# Patient Record
Sex: FEMALE | Race: WHITE | NOT HISPANIC OR LATINO | URBAN - METROPOLITAN AREA
[De-identification: names, ages, dates, MRNs, and addresses within clinical notes are randomized per-mention and may not be internally consistent; named-entity substitution may affect disease eponyms.]

---

## 2019-03-05 ENCOUNTER — EMERGENCY (EMERGENCY)
Facility: HOSPITAL | Age: 84
LOS: 1 days | Discharge: HOME | End: 2019-03-05
Attending: EMERGENCY MEDICINE
Payer: MEDICARE

## 2019-03-05 VITALS
RESPIRATION RATE: 16 BRPM | HEART RATE: 60 BPM | HEIGHT: 61 IN | SYSTOLIC BLOOD PRESSURE: 162 MMHG | DIASTOLIC BLOOD PRESSURE: 82 MMHG | WEIGHT: 164.91 LBS | OXYGEN SATURATION: 97 % | TEMPERATURE: 97 F

## 2019-03-05 VITALS — DIASTOLIC BLOOD PRESSURE: 53 MMHG | SYSTOLIC BLOOD PRESSURE: 160 MMHG | HEART RATE: 62 BPM

## 2019-03-05 PROCEDURE — 99282 EMERGENCY DEPT VISIT SF MDM: CPT

## 2019-03-05 PROCEDURE — 99283 EMERGENCY DEPT VISIT LOW MDM: CPT

## 2019-03-05 RX ORDER — ACETAMINOPHEN 500 MG
650 TABLET ORAL ONCE
Qty: 0 | Refills: 0 | Status: COMPLETED | OUTPATIENT
Start: 2019-03-05 | End: 2019-03-05

## 2019-03-05 RX ADMIN — Medication 650 MILLIGRAM(S): at 08:37
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
## 2019-03-05 NOTE — ED PROVIDER NOTE - NS_EDPROVIDERDISPOUSERTYPE_ED_A_ED
124 Scribe Attestation (For Scribes USE Only)... Attending Attestation (For Attendings USE Only).../Scribe Attestation (For Scribes USE Only)...

## 2019-03-05 NOTE — ED PROVIDER NOTE - CLINICAL SUMMARY MEDICAL DECISION MAKING FREE TEXT BOX
pt refusing workup, feels fine, offered to check carboxy level, pt and daughter share that the exposure was less than 5 minutes as pt exits. discussed anticipatory guidance and return precautions.

## 2019-03-05 NOTE — ED ADULT NURSE NOTE - NSIMPLEMENTINTERV_GEN_ALL_ED
Implemented All Fall with Harm Risk Interventions:  [place] to call system. Call bell, personal items and telephone within reach. Instruct patient to call for assistance. Room bathroom lighting operational. Non-slip footwear when patient is off stretcher. Physically safe environment: no spills, clutter or unnecessary equipment. Stretcher in lowest position, wheels locked, appropriate side rails in place. Provide visual cue, wrist band, yellow gown, etc. Monitor gait and stability. Monitor for mental status changes and reorient to person, place, and time. Review medications for side effects contributing to fall risk. Reinforce activity limits and safety measures with patient and family. Provide visual clues: red socks.

## 2019-03-05 NOTE — ED ADULT NURSE NOTE - CAS DISCH CONDITION
Elbow Bursitis  Introduction  A bursa is a fluid-filled sac that covers and protects a joint. Bursitis is when the fluid-filled sac gets puffy and sore (inflamed). Elbow bursitis, also called olecranon bursitis, happens over your elbow. This may be caused by:  · Injury (acute trauma) to your elbow.  · Leaning on hard surfaces for long periods of time.  · Infection from an injury that breaks the skin near your elbow.  · A bone growth (spur) that forms at the tip of your elbow.  · A medical condition that causes inflammation in your body, such as:  ¨ Gout.  ¨ Rheumatoid arthritis.  Sometimes the cause is not known.  Follow these instructions at home:  · Take medicines only as told by your doctor.  · If you were prescribed an antibiotic medicine, finish all of it even if you start to feel better.  · If your bursitis is caused by an injury, rest your elbow and wear your bandage as told by your doctor. You may also apply ice to the injured area as told by your doctor:  ¨ Put ice in a plastic bag.  ¨ Place a towel between your skin and the bag.  ¨ Leave the ice on for 20 minutes, 2-3 times per day.  · Do not do any activities that cause pain to your elbow.  · Use elbow pads or wraps to cushion your elbow.  Contact a doctor if:  · You have a fever.  · Your symptoms do not get better with treatment.  · Your pain or swelling gets worse.  · Your pain or swelling goes away and then comes back.  · You have drainage of pus from the swollen area over your elbow.  This information is not intended to replace advice given to you by your health care provider. Make sure you discuss any questions you have with your health care provider.  Document Released: 06/07/2011 Document Revised: 05/25/2017 Document Reviewed: 08/26/2015  © 2017 [employer]    
Stable
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
## 2021-03-10 PROBLEM — G30.9 ALZHEIMER'S DISEASE, UNSPECIFIED: Chronic | Status: ACTIVE | Noted: 2019-03-06

## 2021-03-19 ENCOUNTER — APPOINTMENT (OUTPATIENT)
Dept: PULMONOLOGY | Facility: CLINIC | Age: 86
End: 2021-03-19

## 2021-10-13 ENCOUNTER — EMERGENCY (EMERGENCY)
Facility: HOSPITAL | Age: 86
LOS: 1 days | Discharge: LEFT BEFORE DISCHARGE | End: 2021-10-13
Attending: EMERGENCY MEDICINE
Payer: MEDICARE

## 2021-10-13 VITALS
RESPIRATION RATE: 14 BRPM | DIASTOLIC BLOOD PRESSURE: 66 MMHG | HEIGHT: 61 IN | HEART RATE: 60 BPM | SYSTOLIC BLOOD PRESSURE: 105 MMHG | OXYGEN SATURATION: 95 % | TEMPERATURE: 98 F

## 2021-10-13 DIAGNOSIS — Z95.0 PRESENCE OF CARDIAC PACEMAKER: Chronic | ICD-10-CM

## 2021-10-13 PROCEDURE — 99283 EMERGENCY DEPT VISIT LOW MDM: CPT

## 2021-10-13 NOTE — ED PROVIDER NOTE - NSICDXPASTMEDICALHX_GEN_ALL_CORE_FT
PAST MEDICAL HISTORY:  Alzheimer disease     Diabetes     Hypercholesterolemia     Hypertension       Afib     Dementia

## 2021-10-13 NOTE — ED PROVIDER NOTE - NSFOLLOWUPINSTRUCTIONS_ED_ALL_ED_FT
Hypotension    WHAT YOU NEED TO KNOW:    What is hypotension? Hypotension is a condition that causes your blood pressure (BP) to drop lower than it should be. Hypotension may be mild, serious, or life-threatening.    What are the most common types of hypotension?   •Acute: Acute hypotension is a sudden drop in your BP that may be life-threatening.      •Constitutional: Constitutional hypotension means your BP is lower than it should be most or all of the time. This is a chronic condition that occurs with no known medical cause.      •Orthostatic: Orthostatic hypotension normally occurs when you stand up from a sitting or lying position. It is also called postural hypotension.      •Postprandial: Postprandial hypotension means your BP becomes too low after you eat a meal. Your BP may drop within 2 hours after you eat, and is more common when you eat meals high in carbohydrates.      What causes hypotension?   •Anemia or blood loss      •Nervous system, heart, or adrenal disorders      •Dehydration from not drinking enough liquids, frequent vomiting, diarrhea, or severe burns      •Some medicines, such as those used to treat high blood pressure, heart conditions, pain, depression, or cancer      •A blood infection (sepsis)      What increases my risk for hypotension?   •Increasing age      •Drug and alcohol use      •Being bedridden for a long period of time      •Low body weight      •Hemodialysis      •Medical conditions such as diabetes, Parkinson disease, and Alzheimer disease      What are the signs and symptoms of hypotension?   •Feeling anxious, nauseated, tired, or weak      •Lightheadedness, dizziness, or fainting      •Increased sweating, palpitations (fast, forceful heartbeats), tremors, or a seizure       •Headache or pain in your neck, shoulders, chest, lower back, buttocks, or legs      •Blurred vision or changes in vision      •Confusion, decreased memory, or inability to pay attention      How is hypotension diagnosed? Your healthcare provider will ask about your symptoms, health conditions, and medicines. Tell him how often you have symptoms, and if they change during the day. Tell him if you recently have had diarrhea, vomiting, or blood loss. Your healthcare provider will carefully examine you, listen to your heart, and may check your eyes. He may check your body movements and sensations (ability to feel something that touches you). You may also need the following:   •Blood pressure testing: This may be done while you lie down, sit, and stand. You may need to wear a BP monitor to record your BP for up to 24 hours.       •Tilt table testing: You are secured on a table that changes your position. Your BP is checked when the table moves you into each position.      What tests may help find the cause of my hypotension? Many times, hypotension is a symptom of another condition. You may need any of the following tests to find the cause of your hypotension:   •Blood tests: A sample of your blood or urine may be checked for anemia or other conditions causing your hypotension.      •EKG: This test records the electrical activity of your heart. It is used to check for damage or other heart problems that may be causing your hypotension.       •Autonomic nervous system tests: Your healthcare provider may check for changes in how fast your heart beats when you take deep breaths. He may also check for changes in your BP while you put your hand in ice cold water.      •24 hour urine test: During this test you will need to collect all of your urine for 24 hours. You will urinate into a container and the urine will be put into a jug. The jug will need to be kept cold. If you urinate during the night, you will need to save that urine. Healthcare providers will measure and record how much you urinate. At the end of 24 hours, the urine will be sent to a lab for tests.      •Echocardiogram: This is a type of ultrasound, also called an echo. An ultrasound uses sound waves to show pictures of your heart on a monitor. An ultrasound may be done to show how your heart moves when it beats.      How is hypotension treated? Your healthcare provider will work with you to find the cause of your hypotension and help treat your symptoms. You may need the following:   •Compression stockings or abdominal binder: These may help blood return to your heart and decrease your hypotension.      •IV fluids: These may be used to increase your BP if you are dehydrated or have blood loss or sepsis.       •Medicines: ?Alpha-adrenoreceptor agonists: These medicines may increase your BP and decrease your symptoms.       ?Steroids: This medicine helps prevent salt loss from your body. Steroids may also help increase the amount of fluid in your body and raise your BP.      ?Vasopressors: These medicines help constrict (make smaller) your blood vessels and increase your BP. Vasopressor medicines may increase the blood flow to your brain and help decrease your symptoms.      ?Antidiuretic hormone: This medicine helps control your BP and helps decrease your need to urinate during the night.      ?Antiparkinson medicine: This medicine may help increase your standing BP and decrease your symptoms.        What are the risks of hypotension? Without treatment, your symptoms may get worse. You may faint or fall often, which can lead to injuries, such as a broken bone. You may be at increased risk for depression, confusion, and memory problems. Hypotension may cause decreased blood flow to your brain and heart. This may lead to a stroke or heart attack and can be life-threatening. Sepsis-related hypotension is life-threatening without treatment.    How can I manage my symptoms?   •Change your position slowly: When you get out of bed, sit up first, then slowly move your legs to the side of the bed. If you are not having any symptoms, slowly stand up. If you have symptoms, sit down right away.      •Avoid straining: Activities and movements that cause you to strain can cause a drop in your BP. Activities to avoid include lifting, coughing, and other movements that increase the feeling of pressure in your chest.      •Avoid the heat: This can cause a decrease in your BP. Stay inside during very hot days, or limit the amount of time you are outside. Do not take hot baths.      •Exercise and do physical counter maneuvers: Ask your healthcare provider about the best exercise plan for you. Physical counter maneuvers may help to increase your BP and increase blood flow to your heart. They include crossing your legs, squatting, and bending at the waist. You can also rise up on your toes while you are standing, and tighten your thigh muscles.      •Drink liquids as directed: Ask your healthcare provider how much liquid to drink each day and which liquids are best for you. Drink 500 milliliters (½ liter) of liquid quickly in the morning or before meals to help increase your BP. Your healthcare provider may tell you to drink 2 cups of coffee with, or after, breakfast and lunch. The caffeine in the coffee can help prevent a drop in your BP. Your healthcare provider may also give you caffeine pills.       •Change how you eat meals: If your BP drops after you eat large meals, try to eat smaller meals more often. Eat foods low in carbohydrates and cholesterol to help prevent BP drops after you eat. Ask if you need to increase the amount of sodium (salt) you eat each day.      •Raise the head of your bed: Raise the head of your bed 4 to 8 inches. This can help prevent morning BP drops and decrease the need to urinate during the night.      •Do not drink alcohol: Alcohol can make your symptoms worse. Ask for information if you need help quitting.      When should I contact my healthcare provider?   •You vomit several times or have diarrhea, and you cannot drink liquid.      •You have a fever.       •You have new or increased symptoms, such as dizziness, weakness, or fainting.      •Your legs, ankles, and feet are swollen, or you gain weight for no known reason.      •You have questions or concerns about your condition or care.      When should I seek immediate care or call 911?   •You become confused or cannot speak.      •You urinate very little or not at all.      •You have a seizure.      •You have chest pain or trouble breathing.      •You have changes in vision or cannot see.      CARE AGREEMENT:    You have the right to help plan your care. Learn about your health condition and how it may be treated. Discuss treatment options with your healthcare providers to decide what care you want to receive. You always have the right to refuse treatment.
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
## 2021-10-13 NOTE — ED ADULT NURSE NOTE - EXPLANATION OF PATIENT'S REASON FOR LEAVING
Daughter states she does not want interventions for patient and wants to take her home. Patient is stable and doctor explained risks of leaving AMA.

## 2021-10-13 NOTE — ED PROVIDER NOTE - CLINICAL SUMMARY MEDICAL DECISION MAKING FREE TEXT BOX
85 y/o female h/o dementia, HTN, DM, pacemaker, afib on Eliquis, is here for fall out fo bed. Was found to be hypotensive. Pt's daughter would like to take pt home. Daughter understands the risk of low BP due to GI bleed or heart attack, among other causes and would still like to take pt home. Daughter refuses any blood work, EKG, or even core temperature before hand. Risk/benefits/alternatives explained.

## 2021-10-13 NOTE — ED PROVIDER NOTE - OBJECTIVE STATEMENT
85 y/o female h/o dementia, DM, HTN, permanent pacemaker, afib on Eliquis, presents after slipping out of bed. Home health aide called [facility] and pt was found to have BP of 60/40 at home which gradually improved to 105/66 after a half liter of fluids. Pt's daughter Paradise arrived and states that the pt's long time home health aide of 4-5 years has left and for the past 5 days pt has had different aides. Daughter feels the home health aid panicked and acted inappropriately, and would like to take the pt home. Reports no recent illness. Pt has been acting at baseline. No bleeding. No diarrhea. No fever. No vomiting. Daughter states pt just had blood work yesterday through her visiting home doctor Dr. Barron. 87 y/o female h/o dementia, DM, HTN, permanent pacemaker, afib on Eliquis, presents after slipping out of bed. Home health aide called [facility] and pt was found to have BP of 60/40 at home which gradually improved to 105/66 after a half liter of fluids. Pt's daughter Paradise arrived and states that the pt's long time home health aide of 4-5 years has left and for the past 5 days pt has had different aides. Daughter feels the home health aid panicked and acted inappropriately, and would like to take the pt home. Reports no recent illness. Pt has been acting at baseline. No bleeding. No diarrhea. No fever. No vomiting. Daughter states pt just had blood work yesterday through her visiting home doctor Dr. Barron.

## 2021-10-13 NOTE — ED ADULT NURSE NOTE - OBJECTIVE STATEMENT
Patient presents to ED for hypotension. Daughter Paradise is at bedside. Daughter states that she does not want any interventions for patient as she will take patient home. Patient is alert and awake, calm. No respiratory distress noted. VS stable and BP is improved.

## 2021-10-13 NOTE — ED PROVIDER NOTE - PATIENT PORTAL LINK FT
You can access the FollowMyHealth Patient Portal offered by [facility] by registering at the following website: [email or web address]. By joining [facility]’s FollowMyHealth portal, you will also be able to view your health information using other applications (apps) compatible with our system.

## 2022-01-04 ENCOUNTER — INPATIENT (INPATIENT)
Facility: HOSPITAL | Age: 87
LOS: 6 days | Discharge: HOME | DRG: 378 | End: 2022-01-11
Attending: STUDENT IN AN ORGANIZED HEALTH CARE EDUCATION/TRAINING PROGRAM | Admitting: STUDENT IN AN ORGANIZED HEALTH CARE EDUCATION/TRAINING PROGRAM
Payer: MEDICARE

## 2022-01-04 VITALS
HEART RATE: 60 BPM | OXYGEN SATURATION: 96 % | HEIGHT: 61 IN | RESPIRATION RATE: 18 BRPM | SYSTOLIC BLOOD PRESSURE: 57 MMHG | WEIGHT: 167.55 LBS | DIASTOLIC BLOOD PRESSURE: 34 MMHG

## 2022-01-04 DIAGNOSIS — I10 ESSENTIAL (PRIMARY) HYPERTENSION: ICD-10-CM

## 2022-01-04 DIAGNOSIS — F03.90 UNSPECIFIED DEMENTIA WITHOUT BEHAVIORAL DISTURBANCE: ICD-10-CM

## 2022-01-04 DIAGNOSIS — K92.2 GASTROINTESTINAL HEMORRHAGE, UNSPECIFIED: ICD-10-CM

## 2022-01-04 DIAGNOSIS — N17.9 ACUTE KIDNEY FAILURE, UNSPECIFIED: ICD-10-CM

## 2022-01-04 DIAGNOSIS — E11.9 TYPE 2 DIABETES MELLITUS WITHOUT COMPLICATIONS: ICD-10-CM

## 2022-01-04 DIAGNOSIS — Z95.0 PRESENCE OF CARDIAC PACEMAKER: Chronic | ICD-10-CM

## 2022-01-04 DIAGNOSIS — I48.91 UNSPECIFIED ATRIAL FIBRILLATION: ICD-10-CM

## 2022-01-04 DIAGNOSIS — Z29.9 ENCOUNTER FOR PROPHYLACTIC MEASURES, UNSPECIFIED: ICD-10-CM

## 2022-01-04 LAB
ALBUMIN SERPL ELPH-MCNC: 2.1 G/DL — LOW (ref 3.5–5)
ALP SERPL-CCNC: 43 U/L — SIGNIFICANT CHANGE UP (ref 40–120)
ALT FLD-CCNC: 23 U/L DA — SIGNIFICANT CHANGE UP (ref 10–60)
ANION GAP SERPL CALC-SCNC: 9 MMOL/L — SIGNIFICANT CHANGE UP (ref 5–17)
APTT BLD: 33.9 SEC — SIGNIFICANT CHANGE UP (ref 27.5–35.5)
AST SERPL-CCNC: 13 U/L — SIGNIFICANT CHANGE UP (ref 10–40)
BASOPHILS # BLD AUTO: 0.04 K/UL — SIGNIFICANT CHANGE UP (ref 0–0.2)
BASOPHILS NFR BLD AUTO: 0.4 % — SIGNIFICANT CHANGE UP (ref 0–2)
BILIRUB SERPL-MCNC: 0.3 MG/DL — SIGNIFICANT CHANGE UP (ref 0.2–1.2)
BUN SERPL-MCNC: 62 MG/DL — HIGH (ref 7–18)
CALCIUM SERPL-MCNC: 7.9 MG/DL — LOW (ref 8.4–10.5)
CHLORIDE SERPL-SCNC: 106 MMOL/L — SIGNIFICANT CHANGE UP (ref 96–108)
CO2 SERPL-SCNC: 24 MMOL/L — SIGNIFICANT CHANGE UP (ref 22–31)
CREAT SERPL-MCNC: 1.53 MG/DL — HIGH (ref 0.5–1.3)
EOSINOPHIL # BLD AUTO: 0.1 K/UL — SIGNIFICANT CHANGE UP (ref 0–0.5)
EOSINOPHIL NFR BLD AUTO: 1 % — SIGNIFICANT CHANGE UP (ref 0–6)
GLUCOSE SERPL-MCNC: 310 MG/DL — HIGH (ref 70–99)
HCT VFR BLD CALC: 29.1 % — LOW (ref 34.5–45)
HCT VFR BLD CALC: 43.4 % — SIGNIFICANT CHANGE UP (ref 34.5–45)
HGB BLD-MCNC: 13.9 G/DL — SIGNIFICANT CHANGE UP (ref 11.5–15.5)
HGB BLD-MCNC: 9.3 G/DL — LOW (ref 11.5–15.5)
IMM GRANULOCYTES NFR BLD AUTO: 0.8 % — SIGNIFICANT CHANGE UP (ref 0–1.5)
INR BLD: 1.26 RATIO — HIGH (ref 0.88–1.16)
LACTATE SERPL-SCNC: 2.5 MMOL/L — HIGH (ref 0.7–2)
LYMPHOCYTES # BLD AUTO: 1.15 K/UL — SIGNIFICANT CHANGE UP (ref 1–3.3)
LYMPHOCYTES # BLD AUTO: 11.7 % — LOW (ref 13–44)
MCHC RBC-ENTMCNC: 28.9 PG — SIGNIFICANT CHANGE UP (ref 27–34)
MCHC RBC-ENTMCNC: 32 GM/DL — SIGNIFICANT CHANGE UP (ref 32–36)
MCV RBC AUTO: 90.4 FL — SIGNIFICANT CHANGE UP (ref 80–100)
MONOCYTES # BLD AUTO: 0.44 K/UL — SIGNIFICANT CHANGE UP (ref 0–0.9)
MONOCYTES NFR BLD AUTO: 4.5 % — SIGNIFICANT CHANGE UP (ref 2–14)
NEUTROPHILS # BLD AUTO: 8.06 K/UL — HIGH (ref 1.8–7.4)
NEUTROPHILS NFR BLD AUTO: 81.6 % — HIGH (ref 43–77)
NRBC # BLD: 0 /100 WBCS — SIGNIFICANT CHANGE UP (ref 0–0)
OB PNL STL: POSITIVE
PLATELET # BLD AUTO: 152 K/UL — SIGNIFICANT CHANGE UP (ref 150–400)
POTASSIUM SERPL-MCNC: 4.6 MMOL/L — SIGNIFICANT CHANGE UP (ref 3.5–5.3)
POTASSIUM SERPL-SCNC: 4.6 MMOL/L — SIGNIFICANT CHANGE UP (ref 3.5–5.3)
PROT SERPL-MCNC: 5.3 G/DL — LOW (ref 6–8.3)
PROTHROM AB SERPL-ACNC: 14.8 SEC — HIGH (ref 10.6–13.6)
RBC # BLD: 3.22 M/UL — LOW (ref 3.8–5.2)
RBC # FLD: 12 % — SIGNIFICANT CHANGE UP (ref 10.3–14.5)
SARS-COV-2 RNA SPEC QL NAA+PROBE: SIGNIFICANT CHANGE UP
SODIUM SERPL-SCNC: 139 MMOL/L — SIGNIFICANT CHANGE UP (ref 135–145)
TROPONIN I, HIGH SENSITIVITY RESULT: 6.1 NG/L — SIGNIFICANT CHANGE UP
WBC # BLD: 9.87 K/UL — SIGNIFICANT CHANGE UP (ref 3.8–10.5)
WBC # FLD AUTO: 9.87 K/UL — SIGNIFICANT CHANGE UP (ref 3.8–10.5)

## 2022-01-04 PROCEDURE — 99223 1ST HOSP IP/OBS HIGH 75: CPT | Mod: GC

## 2022-01-04 PROCEDURE — 71045 X-RAY EXAM CHEST 1 VIEW: CPT | Mod: 26

## 2022-01-04 PROCEDURE — 99291 CRITICAL CARE FIRST HOUR: CPT

## 2022-01-04 RX ORDER — PROTHROMBIN COMPLEX CONCENTRATE (HUMAN) 25.5; 16.5; 24; 22; 22; 26 [IU]/ML; [IU]/ML; [IU]/ML; [IU]/ML; [IU]/ML; [IU]/ML
3000 POWDER, FOR SOLUTION INTRAVENOUS ONCE
Refills: 0 | Status: COMPLETED | OUTPATIENT
Start: 2022-01-04 | End: 2022-01-04

## 2022-01-04 RX ORDER — MEMANTINE HYDROCHLORIDE 10 MG/1
10 TABLET ORAL DAILY
Refills: 0 | Status: DISCONTINUED | OUTPATIENT
Start: 2022-01-04 | End: 2022-01-11

## 2022-01-04 RX ORDER — LEVOTHYROXINE SODIUM 125 MCG
100 TABLET ORAL DAILY
Refills: 0 | Status: DISCONTINUED | OUTPATIENT
Start: 2022-01-04 | End: 2022-01-11

## 2022-01-04 RX ORDER — ATORVASTATIN CALCIUM 80 MG/1
80 TABLET, FILM COATED ORAL AT BEDTIME
Refills: 0 | Status: DISCONTINUED | OUTPATIENT
Start: 2022-01-04 | End: 2022-01-11

## 2022-01-04 RX ORDER — ERGOCALCIFEROL 1.25 MG/1
50000 CAPSULE ORAL
Refills: 0 | Status: DISCONTINUED | OUTPATIENT
Start: 2022-01-04 | End: 2022-01-11

## 2022-01-04 RX ORDER — ESCITALOPRAM OXALATE 10 MG/1
10 TABLET, FILM COATED ORAL DAILY
Refills: 0 | Status: DISCONTINUED | OUTPATIENT
Start: 2022-01-04 | End: 2022-01-11

## 2022-01-04 RX ORDER — SODIUM CHLORIDE 9 MG/ML
2000 INJECTION INTRAMUSCULAR; INTRAVENOUS; SUBCUTANEOUS ONCE
Refills: 0 | Status: COMPLETED | OUTPATIENT
Start: 2022-01-04 | End: 2022-01-04

## 2022-01-04 RX ORDER — DONEPEZIL HYDROCHLORIDE 10 MG/1
10 TABLET, FILM COATED ORAL AT BEDTIME
Refills: 0 | Status: DISCONTINUED | OUTPATIENT
Start: 2022-01-04 | End: 2022-01-11

## 2022-01-04 RX ORDER — PANTOPRAZOLE SODIUM 20 MG/1
40 TABLET, DELAYED RELEASE ORAL
Refills: 0 | Status: DISCONTINUED | OUTPATIENT
Start: 2022-01-04 | End: 2022-01-09

## 2022-01-04 RX ADMIN — PROTHROMBIN COMPLEX CONCENTRATE (HUMAN) 400 INTERNATIONAL UNIT(S): 25.5; 16.5; 24; 22; 22; 26 POWDER, FOR SOLUTION INTRAVENOUS at 15:46

## 2022-01-04 RX ADMIN — PANTOPRAZOLE SODIUM 40 MILLIGRAM(S): 20 TABLET, DELAYED RELEASE ORAL at 21:20

## 2022-01-04 RX ADMIN — SODIUM CHLORIDE 2000 MILLILITER(S): 9 INJECTION INTRAMUSCULAR; INTRAVENOUS; SUBCUTANEOUS at 14:28

## 2022-01-04 NOTE — H&P ADULT - PROBLEM SELECTOR PLAN 1
- p/w SBP of low 70's  - VS BP 57/37  - FOBT pos  - hgb 9.3>13.9  - s/p NS 2L bolus, 2u pRBC, kcentra, plama, and 1u platelet  - monitor CBC q6h    - GI, Dr. Dalton, onboard

## 2022-01-04 NOTE — H&P ADULT - ATTENDING COMMENTS
Patient was brought because of melena last night and hematochezia this AM.   PMH:  Pacemaker, dementia, DM, HTN, Afib, on eliquis and ASA.   Patient was found to be hypotensive in ED.  She was given fluids, two units of PRBC, and platelets.     Now Alert, cooperative  Vital Signs Last 24 Hrs  T(C): 36.4 (01-04-22 @ 18:15), Max: 36.5 (01-04-22 @ 13:55)  T(F): 97.5 (01-04-22 @ 18:15), Max: 97.7 (01-04-22 @ 13:55)  HR: 62 (01-04-22 @ 18:15) (59 - 62)  BP: 142/69 (01-04-22 @ 18:15) (57/34 - 142/69)  BP(mean): --  RR: 18 (01-04-22 @ 18:15) (18 - 18)  SpO2: 97% (01-04-22 @ 18:15) (96% - 99%)    Pacemaker is palpable  Lungs, clear  Cor, irreg  Abdomen, soft  Neurological, non-focal                     13.9   x     )-----------( x        ( 04 Jan 2022 15:45 )             43.4     01-04    139  |  106  |  62<H>  ----------------------------<  310<H>  4.6   |  24  |  1.53<H>    Ca    7.9<L>      04 Jan 2022 13:20    TPro  5.3<L>  /  Alb  2.1<L>  /  TBili  0.3  /  DBili  x   /  AST  13  /  ALT  23  /  AlkPhos  43  01-04    PT/INR - ( 04 Jan 2022 13:20 )   PT: 14.8 sec;   INR: 1.26 ratio         PTT - ( 04 Jan 2022 13:20 )  PTT:33.9 sec    Lactate Trend  01-04 @ 13:19 Lactate:2.5     POCT Blood Glucose.: 280 mg/dL (04 Jan 2022 16:37)    IMP:  Melena, hematochezia, c/w UGI bleed, stabilized          Dementia, after fluids and blood, mental status is at baseline.  Daughter, Paradise Haddad, prefers not to have CT head because of radiation.           pacemaker in place  Plan: Protonix. NPO.  GI consult, Dr. Dalton will see tomorrow.  Possible EGD tomorrow.           Pacemaker will need to be checked.           Hold ASA and eliquis, at present.

## 2022-01-04 NOTE — ED PROVIDER NOTE - NSICDXPASTMEDICALHX_GEN_ALL_CORE_FT
PAST MEDICAL HISTORY:  Afib     Alzheimer disease     Dementia     Diabetes     Hypercholesterolemia     Hypertension

## 2022-01-04 NOTE — ED PROVIDER NOTE - PROGRESS NOTE DETAILS
Pt's daughter, Paradise, called and updated.  She says there is no advanced directive at this time and she will talk it over with Pt's son.  Phone number [phone]. Pt seen by Dr. Milton from ICU--since Pt is now stable, no e/o active bleeding, H/H repeated and much improved, he advises admission to the floor.   Dr. PHILLIP Ocampo accepts admission. Pt seen by Dr. Milton from ICU--since Pt is now stable, no e/o active bleeding, H/H repeated and much improved, he advises admission to the floor. Dr. Chinchilla, hospitalist, informed for admission.  VS are stable.  Dr. Chinchilla requests consult to KAMI Jenkins. Dr. Dalton was paged for GI but no response.

## 2022-01-04 NOTE — H&P ADULT - HISTORY OF PRESENT ILLNESS
Patient is a 86yoF, w/ AAOx1-2 and ambulates w/ assistance only at baseline, w/ PMH of dementia, DM, HTN, afib (on Eliquis), permanent pacemaker, p/w low systolic blood pressure of low 70's. Patient is a poor historian 2/2 baseline mentation. The daughter reports that patient had a mild to moderate amount of black stool yesterday and black vomits twice this morning. Family reports they haven't notice any changes in her until yesterday. The daughter reports that patient had night sweat and seemed weak today. The daughter reports no signs or complaints of fever, chills, chest pain, sob, abdominal pain, or urinary changes.

## 2022-01-04 NOTE — ED ADULT NURSE NOTE - OBJECTIVE STATEMENT
pt is here for altered mental status.  BIBA, call 911 from her aide, c/o vomiting with confused, dark stool, denied chest pain or sob, confused at this time.

## 2022-01-04 NOTE — H&P ADULT - ASSESSMENT
Patient is a 86yoF, w/ AAOx1-2 and ambulates w/ assistance only at baseline, w/ PMH of dementia, DM, HTN, afib (on Eliquis), permanent pacemaker, p/w low systolic blood pressure of low 70's. Admitted for GI bleed.

## 2022-01-04 NOTE — H&P ADULT - PROBLEM SELECTOR PLAN 4
- h/o diabetes, on insulin and oral anti-glycemic meds  - c/w ISS  - FS qACHs  - f/u a1c    - primary team to follow up on home anti-glycemic meds

## 2022-01-04 NOTE — ED PROVIDER NOTE - CLINICAL SUMMARY MEDICAL DECISION MAKING FREE TEXT BOX
85 y/o woman, h/o  A-fib, on Eliquis and aspirin, PPM, DM, HTN, hypothyroidism, CKD, advanced dementia, depression, BIBA from home for coffee-ground emesis, lethargy--aggressive IVF resuscitation and code fusion for O negative blood transfusion, labs, EKG, Covid swab, urine, admission.   KCentra given as well given life-threatening GI bleed while on Eliquis.

## 2022-01-04 NOTE — ED PROVIDER NOTE - OBJECTIVE STATEMENT
85 y/o woman, h/o  A-fib, on Eliquis and aspirin, PPM, DM, HTN, hypothyroidism, CKD, advanced dementia, depression, BIBA from home for coffee-ground emesis, lethargy, but very limited history initially as EMS had difficulty with language barrier and Pt altered.  EMS reported severe hypotension, sBP in 60's and they were unable to establish IV access.  Subsequently additional history from NP, Jayleen Conroy, who called to supplement history.  Her group admits to the hospitalist.

## 2022-01-04 NOTE — CONSULT NOTE ADULT - ATTENDING COMMENTS
Patient is a 86 year old female with 24/7 HHA, with PMH of dementia, glaucoma, PPM, ?ulcer, DM, HTN and COVID, who presented to the ED due to melena and weakness. Noted to have Hgb of 9.3 on admission. Creatinine of 1.53. Lactate of 2.5. FOBT positive. Given 2L NS bolus, 2 PRBCs, 1 plasma, 1 platelet and Kcentra in ED. ICU consulted for further evaluation.     Assessment:  - GI bleed  - IZAIAH   - Anemia   - Dementia  - Glaucoma  - PPM   - DM  - HTN    Sugg;  - Pat repeat CBC is greater than expected for correction   - CBC q6h   - PPI gtt  - No anticoag   - GI eval   - Hemodynamic stable   - will not admit to icu at this time   - re-consult as needed    pat seen in ER with PGY-3 and discussed with ER attend

## 2022-01-04 NOTE — ED ADULT NURSE NOTE - NSIMPLEMENTINTERV_GEN_ALL_ED
Implemented All Fall with Harm Risk Interventions:  [place] to call system. Call bell, personal items and telephone within reach. Instruct patient to call for assistance. Room bathroom lighting operational. Non-slip footwear when patient is off stretcher. Physically safe environment: no spills, clutter or unnecessary equipment. Stretcher in lowest position, wheels locked, appropriate side rails in place. Provide visual cue, wrist band, yellow gown, etc. Monitor gait and stability. Monitor for mental status changes and reorient to person, place, and time. Review medications for side effects contributing to fall risk. Reinforce activity limits and safety measures with patient and family. Provide visual clues: red socks.

## 2022-01-05 LAB
A1C WITH ESTIMATED AVERAGE GLUCOSE RESULT: 7.3 % — HIGH (ref 4–5.6)
ANION GAP SERPL CALC-SCNC: 6 MMOL/L — SIGNIFICANT CHANGE UP (ref 5–17)
BASOPHILS # BLD AUTO: 0.03 K/UL — SIGNIFICANT CHANGE UP (ref 0–0.2)
BASOPHILS NFR BLD AUTO: 0.4 % — SIGNIFICANT CHANGE UP (ref 0–2)
BUN SERPL-MCNC: 50 MG/DL — HIGH (ref 7–18)
CALCIUM SERPL-MCNC: 8.3 MG/DL — LOW (ref 8.4–10.5)
CHLORIDE SERPL-SCNC: 107 MMOL/L — SIGNIFICANT CHANGE UP (ref 96–108)
CHOLEST SERPL-MCNC: 109 MG/DL — SIGNIFICANT CHANGE UP
CO2 SERPL-SCNC: 29 MMOL/L — SIGNIFICANT CHANGE UP (ref 22–31)
CREAT SERPL-MCNC: 1.1 MG/DL — SIGNIFICANT CHANGE UP (ref 0.5–1.3)
EOSINOPHIL # BLD AUTO: 0.21 K/UL — SIGNIFICANT CHANGE UP (ref 0–0.5)
EOSINOPHIL NFR BLD AUTO: 2.8 % — SIGNIFICANT CHANGE UP (ref 0–6)
ESTIMATED AVERAGE GLUCOSE: 163 MG/DL — HIGH (ref 68–114)
GLUCOSE BLDC GLUCOMTR-MCNC: 148 MG/DL — HIGH (ref 70–99)
GLUCOSE BLDC GLUCOMTR-MCNC: 149 MG/DL — HIGH (ref 70–99)
GLUCOSE BLDC GLUCOMTR-MCNC: 154 MG/DL — HIGH (ref 70–99)
GLUCOSE BLDC GLUCOMTR-MCNC: 182 MG/DL — HIGH (ref 70–99)
GLUCOSE SERPL-MCNC: 147 MG/DL — HIGH (ref 70–99)
HCT VFR BLD CALC: 35.5 % — SIGNIFICANT CHANGE UP (ref 34.5–45)
HCT VFR BLD CALC: 37 % — SIGNIFICANT CHANGE UP (ref 34.5–45)
HDLC SERPL-MCNC: 38 MG/DL — LOW
HGB BLD-MCNC: 11.9 G/DL — SIGNIFICANT CHANGE UP (ref 11.5–15.5)
HGB BLD-MCNC: 12.4 G/DL — SIGNIFICANT CHANGE UP (ref 11.5–15.5)
IMM GRANULOCYTES NFR BLD AUTO: 0.4 % — SIGNIFICANT CHANGE UP (ref 0–1.5)
LIDOCAIN IGE QN: 105 U/L — SIGNIFICANT CHANGE UP (ref 73–393)
LIPID PNL WITH DIRECT LDL SERPL: 23 MG/DL — SIGNIFICANT CHANGE UP
LYMPHOCYTES # BLD AUTO: 1.1 K/UL — SIGNIFICANT CHANGE UP (ref 1–3.3)
LYMPHOCYTES # BLD AUTO: 14.9 % — SIGNIFICANT CHANGE UP (ref 13–44)
MAGNESIUM SERPL-MCNC: 1.9 MG/DL — SIGNIFICANT CHANGE UP (ref 1.6–2.6)
MCHC RBC-ENTMCNC: 28.6 PG — SIGNIFICANT CHANGE UP (ref 27–34)
MCHC RBC-ENTMCNC: 29 PG — SIGNIFICANT CHANGE UP (ref 27–34)
MCHC RBC-ENTMCNC: 33.5 GM/DL — SIGNIFICANT CHANGE UP (ref 32–36)
MCHC RBC-ENTMCNC: 33.5 GM/DL — SIGNIFICANT CHANGE UP (ref 32–36)
MCV RBC AUTO: 85.3 FL — SIGNIFICANT CHANGE UP (ref 80–100)
MCV RBC AUTO: 86.6 FL — SIGNIFICANT CHANGE UP (ref 80–100)
MONOCYTES # BLD AUTO: 0.4 K/UL — SIGNIFICANT CHANGE UP (ref 0–0.9)
MONOCYTES NFR BLD AUTO: 5.4 % — SIGNIFICANT CHANGE UP (ref 2–14)
NEUTROPHILS # BLD AUTO: 5.61 K/UL — SIGNIFICANT CHANGE UP (ref 1.8–7.4)
NEUTROPHILS NFR BLD AUTO: 76.1 % — SIGNIFICANT CHANGE UP (ref 43–77)
NON HDL CHOLESTEROL: 71 MG/DL — SIGNIFICANT CHANGE UP
NRBC # BLD: 0 /100 WBCS — SIGNIFICANT CHANGE UP (ref 0–0)
NRBC # BLD: 0 /100 WBCS — SIGNIFICANT CHANGE UP (ref 0–0)
PHOSPHATE SERPL-MCNC: 2.3 MG/DL — LOW (ref 2.5–4.5)
PLATELET # BLD AUTO: 208 K/UL — SIGNIFICANT CHANGE UP (ref 150–400)
PLATELET # BLD AUTO: 213 K/UL — SIGNIFICANT CHANGE UP (ref 150–400)
POTASSIUM SERPL-MCNC: 4 MMOL/L — SIGNIFICANT CHANGE UP (ref 3.5–5.3)
POTASSIUM SERPL-SCNC: 4 MMOL/L — SIGNIFICANT CHANGE UP (ref 3.5–5.3)
RBC # BLD: 4.1 M/UL — SIGNIFICANT CHANGE UP (ref 3.8–5.2)
RBC # BLD: 4.34 M/UL — SIGNIFICANT CHANGE UP (ref 3.8–5.2)
RBC # FLD: 13.8 % — SIGNIFICANT CHANGE UP (ref 10.3–14.5)
RBC # FLD: 14 % — SIGNIFICANT CHANGE UP (ref 10.3–14.5)
SODIUM SERPL-SCNC: 142 MMOL/L — SIGNIFICANT CHANGE UP (ref 135–145)
TRIGL SERPL-MCNC: 241 MG/DL — HIGH
TSH SERPL-MCNC: 1.23 UU/ML — SIGNIFICANT CHANGE UP (ref 0.34–4.82)
WBC # BLD: 6.47 K/UL — SIGNIFICANT CHANGE UP (ref 3.8–10.5)
WBC # BLD: 7.38 K/UL — SIGNIFICANT CHANGE UP (ref 3.8–10.5)
WBC # FLD AUTO: 6.47 K/UL — SIGNIFICANT CHANGE UP (ref 3.8–10.5)
WBC # FLD AUTO: 7.38 K/UL — SIGNIFICANT CHANGE UP (ref 3.8–10.5)

## 2022-01-05 PROCEDURE — 99222 1ST HOSP IP/OBS MODERATE 55: CPT

## 2022-01-05 PROCEDURE — 99233 SBSQ HOSP IP/OBS HIGH 50: CPT | Mod: GC

## 2022-01-05 RX ORDER — POTASSIUM PHOSPHATE, MONOBASIC POTASSIUM PHOSPHATE, DIBASIC 236; 224 MG/ML; MG/ML
30 INJECTION, SOLUTION INTRAVENOUS ONCE
Refills: 0 | Status: COMPLETED | OUTPATIENT
Start: 2022-01-05 | End: 2022-01-05

## 2022-01-05 RX ADMIN — MEMANTINE HYDROCHLORIDE 10 MILLIGRAM(S): 10 TABLET ORAL at 11:26

## 2022-01-05 RX ADMIN — DONEPEZIL HYDROCHLORIDE 10 MILLIGRAM(S): 10 TABLET, FILM COATED ORAL at 21:58

## 2022-01-05 RX ADMIN — POTASSIUM PHOSPHATE, MONOBASIC POTASSIUM PHOSPHATE, DIBASIC 83.33 MILLIMOLE(S): 236; 224 INJECTION, SOLUTION INTRAVENOUS at 11:25

## 2022-01-05 RX ADMIN — PANTOPRAZOLE SODIUM 40 MILLIGRAM(S): 20 TABLET, DELAYED RELEASE ORAL at 06:25

## 2022-01-05 RX ADMIN — Medication 100 MICROGRAM(S): at 06:25

## 2022-01-05 RX ADMIN — ESCITALOPRAM OXALATE 10 MILLIGRAM(S): 10 TABLET, FILM COATED ORAL at 11:26

## 2022-01-05 RX ADMIN — ERGOCALCIFEROL 50000 UNIT(S): 1.25 CAPSULE ORAL at 11:25

## 2022-01-05 RX ADMIN — ATORVASTATIN CALCIUM 80 MILLIGRAM(S): 80 TABLET, FILM COATED ORAL at 21:58

## 2022-01-05 NOTE — CONSULT NOTE ADULT - SUBJECTIVE AND OBJECTIVE BOX
CC: Dark emesis and melena.    HPI:  86F with pmhx of dementia (AAOx1-2 baseline), DM, HTN, Afib on eliquis, PPM presenting with hypotension, dark emesis, and melena. History limited as patient with dementia, AAOx1 on exam. History obtained per chart and daughter. She states that patient had black stool yesterday along with two episodes of dark emesis. Reports hx of PUD, does not recall last EGD. Pt reportedly appeared fatigued. Denies any hematochezia, fever/chills, abdominal pain, or other issues.    PAST MEDICAL HISTORY:   Above.    SURGICAL HISTORY:   PPM.    MEDICATIONS:  atorvastatin 80 milliGRAM(s) Oral at bedtime  donepezil 10 milliGRAM(s) Oral at bedtime  ergocalciferol 18594 Unit(s) Oral <User Schedule>  escitalopram 10 milliGRAM(s) Oral daily  levothyroxine 100 MICROGram(s) Oral daily  memantine 10 milliGRAM(s) Oral daily  pantoprazole  Injectable 40 milliGRAM(s) IV Push two times a day    ALLERGIES:  No Known Allergies    SOCIAL HISTORY:   EtOH: denies, Cigarette: denies, Illicit drug: denies (04 Jan 2022 19:59)    FAMILY HISTORY:  Denies fhx of GI disorder.       REVIEW OF SYSTEMS:  Unable to obtain given mental status.     PHYSICAL EXAM:  VITAL SIGNS:  T(C): 36.3 (01-05-22 @ 15:44), Max: 36.4 (01-04-22 @ 18:15)  HR: 68 (01-05-22 @ 15:44) (59 - 68)  BP: 153/59 (01-05-22 @ 15:44) (94/55 - 153/59)  RR: 18 (01-05-22 @ 15:44) (18 - 18)  SpO2: 97% (01-05-22 @ 15:44) (96% - 99%)  I/Os:     01-05-22 @ 07:01  -  01-05-22 @ 16:41  --------------------------------------------------------  IN: 0 mL / OUT: 300 mL / NET: -300 mL      GENERAL: NAD, lying in bed, irritated.   HEAD:  Atraumatic, Normocephalic.  EYES: EOMI, normal conjunctiva, no scleral icterus.  ENT: Moist mucous membranes.  NECK: Supple.  CHEST/LUNG: Clear to auscultation bilaterally; No rales, rhonchi, wheezing, or rubs. Unlabored respirations.  HEART: Regular rate and rhythm; No murmurs, rubs, or gallops.  ABDOMEN: BSx4; Soft, nontender, nondistended.  EXTREMITIES:  No edema.  NERVOUS SYSTEM:  A&Ox1, no obvious focal deficits.  SKIN: No rashes or lesions.      LABS:                         11.9   7.38  )-----------( 208      ( 05 Jan 2022 06:08 )             35.5     01-05    142  |  107  |  50<H>  ----------------------------<  147<H>  4.0   |  29  |  1.10    Ca    8.3<L>      05 Jan 2022 06:08  Phos  2.3     01-05  Mg     1.9     01-05    TPro  5.3<L>  /  Alb  2.1<L>  /  TBili  0.3  /  DBili  x   /  AST  13  /  ALT  23  /  AlkPhos  43  01-04    PT/INR - ( 04 Jan 2022 13:20 )   PT: 14.8 sec;   INR: 1.26 ratio         PTT - ( 04 Jan 2022 13:20 )  PTT:33.9 sec      RADIOLOGY & ADDITIONAL TESTS: Reviewed. 
Patient is a 86y old  Female who presents with a chief complaint of GI bleed.     HPI: Patient is a 86 year old female with 24/7 HHA, with PMH of dementia, glaucoma, PPM, ?ulcer, DM, HTN and COVID, who presented to the ED due to melena and weakness. Patient is a poor historian. Further history obtained from daughter, Elen ([phone]). Daughter states that patient was noted to have some dark stool last night. This morning, patient's blood pressure was noted to be low and and ?episode of coffee ground emesis this morning. Daughter denies any episodes similar to this in the past.      Allergies  No Known Allergies      Daily Height in cm: 154.94 (04 Jan 2022 12:29)    Daily     Drug Dosing Weight  Height (cm): 154.9 (04 Jan 2022 12:29)  Weight (kg): 76 (04 Jan 2022 12:29)  BMI (kg/m2): 31.7 (04 Jan 2022 12:29)  BSA (m2): 1.75 (04 Jan 2022 12:29)    PAST MEDICAL & SURGICAL HISTORY:  Diabetes    Hypertension    Hypercholesterolemia    Alzheimer disease    Afib    Dementia    Presence of permanent cardiac pacemaker    FAMILY HISTORY: No pertinent family history     SOCIAL HISTORY: Daughter denies any smoking, alcohol use or use of illicit drugs.     ADVANCE DIRECTIVES: Full code      REVIEW OF SYSTEMS:  Unable to obtain complete ROS due to dementia baseline.     ICU Vital Signs Last 24 Hrs  T(C): 36.3 (04 Jan 2022 16:00), Max: 36.5 (04 Jan 2022 13:55)  T(F): 97.4 (04 Jan 2022 16:00), Max: 97.7 (04 Jan 2022 13:55)  HR: 60 (04 Jan 2022 16:00) (59 - 61)  BP: 139/85 (04 Jan 2022 16:00) (57/34 - 139/85)  RR: 18 (04 Jan 2022 16:00) (18 - 18)  SpO2: 99% (04 Jan 2022 16:00) (96% - 99%)    PHYSICAL EXAM:  GENERAL: Patient seen resting in bed and in no acute distress   HEAD: Atraumatic, Normocephalic  EYES: PERRLA, conjunctiva and sclera clear  ENMT: Moist mucous membranes, Good dentition, No lesions  NECK: Supple  NERVOUS SYSTEM: Patient awake but minimally responsive to questions   CHEST/LUNG: Clear to auscultation bilaterally  HEART: Regular rate and rhythm; No murmurs  ABDOMEN: Soft, mild tenderness to palpation, Nondistended; Bowel sounds present  EXTREMITIES: No clubbing, cyanosis, or edema  SKIN: No rashes or lesions    LABS:  CBC Full  -  ( 04 Jan 2022 15:45 )  WBC Count : x  RBC Count : x  Hemoglobin : 13.9 g/dL  Hematocrit : 43.4 %      01-04    139  |  106  |  62<H>  ----------------------------<  310<H>  4.6   |  24  |  1.53<H>    Ca    7.9<L>      04 Jan 2022 13:20    TPro  5.3<L>  /  Alb  2.1<L>  /  TBili  0.3  /  DBili  x   /  AST  13  /  ALT  23  /  AlkPhos  43  01-04    CAPILLARY BLOOD GLUCOSE      POCT Blood Glucose.: 280 mg/dL (04 Jan 2022 16:37)    PT/INR - ( 04 Jan 2022 13:20 )   PT: 14.8 sec;   INR: 1.26 ratio         PTT - ( 04 Jan 2022 13:20 )  PTT:33.9 sec

## 2022-01-05 NOTE — PATIENT PROFILE ADULT - FUNCTIONAL ASSESSMENT - BASIC MOBILITY 2.
Late prematurity Late prematurity Late prematurity Late prematurity Late prematurity Late prematurity Prematurity Prematurity Prematurity Prematurity Late prematurity Late prematurity Prematurity 1 = Total assistance

## 2022-01-05 NOTE — PATIENT PROFILE ADULT - FALL HARM RISK - HARM RISK INTERVENTIONS
Communicate Risk of Fall with Harm to all staff/Reinforce activity limits and safety measures with patient and family/Tailored Fall Risk Interventions/Visual Cue: Yellow wristband and red socks/Bed in lowest position, wheels locked, appropriate side rails in place/Call bell, personal items and telephone in reach/Instruct patient to call for assistance before getting out of bed or chair/Non-slip footwear when patient is out of bed/[place] to call system/Physically safe environment - no spills, clutter or unnecessary equipment/Purposeful Proactive Rounding/Room/bathroom lighting operational, light cord in reach Assistance with ambulation/Assistance OOB with selected safe patient handling equipment/Communicate Risk of Fall with Harm to all staff/Discuss with provider need for PT consult/Monitor gait and stability/Reinforce activity limits and safety measures with patient and family/Tailored Fall Risk Interventions/Visual Cue: Yellow wristband and red socks/Bed in lowest position, wheels locked, appropriate side rails in place/Call bell, personal items and telephone in reach/Instruct patient to call for assistance before getting out of bed or chair/Non-slip footwear when patient is out of bed/[place] to call system/Physically safe environment - no spills, clutter or unnecessary equipment/Purposeful Proactive Rounding/Room/bathroom lighting operational, light cord in reach

## 2022-01-05 NOTE — PROGRESS NOTE ADULT - PROBLEM SELECTOR PLAN 1
- p/w SBP of low 70's  - VS BP 57/37  - FOBT pos  - hgb 9.3>13.9  - s/p NS 2L bolus, 2u pRBC, kcentra, plasma, and 1u platelet  - monitor CBC q6h  - NPO after midnight  - EGD tmw   - clear liq diet and NPO after midnight   - GI, Dr. Dalton, onboard

## 2022-01-05 NOTE — PATIENT PROFILE ADULT - LEGAL HELP
Show Applicator Variable?: Yes Post-Care Instructions: I reviewed with the patient in detail post-care instructions. Patient is to wear sunprotection, and avoid picking at any of the treated lesions. Pt may apply Vaseline to crusted or scabbing areas. Include Z78.9 (Other Specified Conditions Influencing Health Status) As An Associated Diagnosis?: No Consent: The patient's consent was obtained including but not limited to risks of crusting, scabbing, blistering, scarring, darker or lighter pigmentary change, recurrence, incomplete removal and infection. Medical Necessity Clause: This procedure was medically necessary because the lesions that were treated were: Duration Of Freeze Thaw-Cycle (Seconds): 5 Medical Necessity Information: It is in your best interest to select a reason for this procedure from the list below. All of these items fulfill various CMS LCD requirements except the new and changing color options. Detail Level: Detailed Number Of Freeze-Thaw Cycles: 1 freeze-thaw cycle no

## 2022-01-05 NOTE — CONSULT NOTE ADULT - ASSESSMENT
86F with pmhx of dementia (AAOx1-2 baseline), DM, HTN, Afib on eliquis, PPM presenting with hypotension, dark emesis, and melena. BUN elevated, initial Hb 9, responded appropriately to 2U PRBCs, now 11. Now normotensive, no further melena or coffee ground emesis overnight.  1. Suspected upper GI bleed: Reports hx of PUD, ddx includes PUD, AVMs, MW tear, etc.  - Recommended EGD to evaluate and treat however extensive discussion had with patient's daughter over the phone and she wishes to hold off for now given her age/dementia. Discussed although patient's bleeding looks to have slowed/ceased on PPI, unable to determine source of bleeding and definitely a risk of ongoing/recurring bleeding without EGD. She expressed understanding, wishes to defer for now but will reconsider if signs of recurring bleeding (i.e. recurring melena, emesis, or Hb drop).   - Can trial clear liquid diet today. NPO after midnight in case need for EGD tomorrow.  - Continue PPI IV BID.   - CBC q12h.  - Maintain two large bore IVs.   
Patient is a 86 year old female with 24/7 HHA, with PMH of dementia, glaucoma, PPM, ?ulcer, DM, HTN and COVID, who presented to the ED due to melena and weakness. Noted to have Hgb of 9.3 on admission. Creatinine of 1.53. Lactate of 2.5. FOBT positive. Given 2L NS bolus, 2 PRBCs, 1 plasma, 1 platelet and Kcentra in ED. ICU consulted for further evaluation.     Assessment:  - GI bleed  - IZAIAH   - Anemia   - Dementia  - Glaucoma  - PPM   - DM  - HTN    Plan:  Neuro:  #Dementia  - supportive care   - can hold any PO meds for now if patient is to be NPO     Cardiovascular:  #HTN  - monitor BP   - hold BP meds for now since on lower side  - resume BP meds as needed    Pulmonary:   - no acute issues     Infectious Diseases:  - no acute issues     Gastrointestinal:  #GI bleed   - patient presented due to melena  - received 2 PRBC, 1 plt and 1 plasma in ED  - also to be given Kcentra  - Hgb repeated and improved to 13.9   - recommend GI consult for further evaluation and possible EGD   - monitor CBC closely     Renal:  #IZAIAH  - creatinine of 1.53 on admission   - may be elevated due to prerenal cause  - monitor BMP daily     Heme/onc:   #Anemia  - Hgb of 9.3 on admission   - repeat of 13.9 after PRBC  - may be related to upper GI bleed  - monitor CBC closely   - recommend GI evaluation     Endo:   #DM  - recommend monitor BS q6 if patient is NPO    Skin/ catheter:   - no acute issues     Prophylaxis:   - hold chemical prophylaxis in setting of GI bleed  - SCDs for now  - consider PPI IV BID     Goals of Care:   - discussed GOC with daughter, Paradise ([phone])  - patient is FULL CODE    Dispo: Patient does not need ICU level care at this time. Can monitor on medical floors.    Reconsult as needed.

## 2022-01-05 NOTE — PROGRESS NOTE ADULT - ATTENDING COMMENTS
Patient is alert and hemodynamically stable.   Vital Signs Last 24 Hrs  T(C): 36.3 (01-05-22 @ 15:44), Max: 36.4 (01-05-22 @ 11:06)  T(F): 97.3 (01-05-22 @ 15:44), Max: 97.5 (01-05-22 @ 11:06)  HR: 68 (01-05-22 @ 15:44) (59 - 68)  BP: 153/59 (01-05-22 @ 15:44) (94/55 - 153/59)  BP(mean): --  RR: 18 (01-05-22 @ 15:44) (18 - 18)  SpO2: 97% (01-05-22 @ 15:44) (96% - 99%)                          11.9   7.38  )-----------( 208      ( 05 Jan 2022 06:08 )             35.5     Discussed with outpatient agency NP.  Discussed with daughter, who is not comfortable having EGD done here.   Will follow up vs and CBC.  Discharge home, if stable.

## 2022-01-05 NOTE — PATIENT PROFILE ADULT - FUNCTIONAL SCREEN CURRENT LEVEL: SWALLOWING (IF SCORE 2 OR MORE FOR ANY ITEM, CONSULT REHAB SERVICES), MLM)
unable to assess, pt is confused unable to assess, pt is confused/2 = difficulty swallowing liquids/foods

## 2022-01-05 NOTE — PROGRESS NOTE ADULT - SUBJECTIVE AND OBJECTIVE BOX
PGY-1 Progress Note discussed with attending    PAGER #: [[phone]] TILL 5:00 PM  PLEASE CONTACT ON CALL TEAM:  - On Call Team (Please refer to Peter) FROM 5:00 PM - 8:30PM  - Nightfloat Team FROM 8:30 -7:30 AM      INTERVAL HPI/OVERNIGHT EVENTS:   - Pt assessed and examined at bedside. Unable to participate in ROS d/t MS.     REVIEW OF SYSTEMS:  as stated in hpi    MEDICATIONS  (STANDING):  atorvastatin 80 milliGRAM(s) Oral at bedtime  donepezil 10 milliGRAM(s) Oral at bedtime  ergocalciferol 79177 Unit(s) Oral <User Schedule>  escitalopram 10 milliGRAM(s) Oral daily  levothyroxine 100 MICROGram(s) Oral daily  memantine 10 milliGRAM(s) Oral daily  pantoprazole  Injectable 40 milliGRAM(s) IV Push two times a day    MEDICATIONS  (PRN):      Vital Signs Last 24 Hrs  T(C): 36.4 (05 Jan 2022 11:06), Max: 36.5 (04 Jan 2022 13:55)  T(F): 97.5 (05 Jan 2022 11:06), Max: 97.7 (04 Jan 2022 13:55)  HR: 60 (05 Jan 2022 11:06) (59 - 62)  BP: 118/53 (05 Jan 2022 11:06) (92/59 - 142/69)  BP(mean): --  RR: 18 (05 Jan 2022 11:06) (18 - 18)  SpO2: 96% (05 Jan 2022 11:06) (96% - 99%)    PHYSICAL EXAMINATION:  GENERAL: NAD,   HEAD: AT/NC  EYES: conjunctiva and sclera clear  NECK: supple, No JVD noted, Normal thyroid  CHEST/LUNG: CTABL; no rales, rhonchi, wheezing, or rubs  HEART: regular rate and rhythm; no murmurs, rubs, or gallops  ABDOMEN: soft, nontender, nondistended; Bowel sounds present  EXTREMITIES:  2+ Peripheral Pulses, No clubbing, cyanosis, or edema  SKIN: warm dry                          11.9   7.38  )-----------( 208      ( 05 Jan 2022 06:08 )             35.5     01-05    142  |  107  |  50<H>  ----------------------------<  147<H>  4.0   |  29  |  1.10    Ca    8.3<L>      05 Jan 2022 06:08  Phos  2.3     01-05  Mg     1.9     01-05    TPro  5.3<L>  /  Alb  2.1<L>  /  TBili  0.3  /  DBili  x   /  AST  13  /  ALT  23  /  AlkPhos  43  01-04    LIVER FUNCTIONS - ( 04 Jan 2022 13:20 )  Alb: 2.1 g/dL / Pro: 5.3 g/dL / ALK PHOS: 43 U/L / ALT: 23 U/L DA / AST: 13 U/L / GGT: x               PT/INR - ( 04 Jan 2022 13:20 )   PT: 14.8 sec;   INR: 1.26 ratio         PTT - ( 04 Jan 2022 13:20 )  PTT:33.9 sec  COVID-19 PCR: NotDetec (04 Jan 2022 16:55)      CAPILLARY BLOOD GLUCOSE      POCT Blood Glucose.: 154 mg/dL (05 Jan 2022 11:28)  POCT Blood Glucose.: 148 mg/dL (05 Jan 2022 07:50)  POCT Blood Glucose.: 280 mg/dL (04 Jan 2022 16:37)      RADIOLOGY & ADDITIONAL TESTS:

## 2022-01-06 LAB
ANION GAP SERPL CALC-SCNC: 10 MMOL/L — SIGNIFICANT CHANGE UP (ref 5–17)
BUN SERPL-MCNC: 33 MG/DL — HIGH (ref 7–18)
CALCIUM SERPL-MCNC: 9.3 MG/DL — SIGNIFICANT CHANGE UP (ref 8.4–10.5)
CHLORIDE SERPL-SCNC: 104 MMOL/L — SIGNIFICANT CHANGE UP (ref 96–108)
CO2 SERPL-SCNC: 28 MMOL/L — SIGNIFICANT CHANGE UP (ref 22–31)
CREAT SERPL-MCNC: 1.23 MG/DL — SIGNIFICANT CHANGE UP (ref 0.5–1.3)
GLUCOSE BLDC GLUCOMTR-MCNC: 194 MG/DL — HIGH (ref 70–99)
GLUCOSE BLDC GLUCOMTR-MCNC: 247 MG/DL — HIGH (ref 70–99)
GLUCOSE BLDC GLUCOMTR-MCNC: 250 MG/DL — HIGH (ref 70–99)
GLUCOSE BLDC GLUCOMTR-MCNC: 275 MG/DL — HIGH (ref 70–99)
GLUCOSE SERPL-MCNC: 221 MG/DL — HIGH (ref 70–99)
HCT VFR BLD CALC: 36.8 % — SIGNIFICANT CHANGE UP (ref 34.5–45)
HCT VFR BLD CALC: 37.2 % — SIGNIFICANT CHANGE UP (ref 34.5–45)
HGB BLD-MCNC: 12 G/DL — SIGNIFICANT CHANGE UP (ref 11.5–15.5)
HGB BLD-MCNC: 12.4 G/DL — SIGNIFICANT CHANGE UP (ref 11.5–15.5)
MAGNESIUM SERPL-MCNC: 2 MG/DL — SIGNIFICANT CHANGE UP (ref 1.6–2.6)
MCHC RBC-ENTMCNC: 28.9 PG — SIGNIFICANT CHANGE UP (ref 27–34)
MCHC RBC-ENTMCNC: 29 PG — SIGNIFICANT CHANGE UP (ref 27–34)
MCHC RBC-ENTMCNC: 32.6 GM/DL — SIGNIFICANT CHANGE UP (ref 32–36)
MCHC RBC-ENTMCNC: 33.3 GM/DL — SIGNIFICANT CHANGE UP (ref 32–36)
MCV RBC AUTO: 86.9 FL — SIGNIFICANT CHANGE UP (ref 80–100)
MCV RBC AUTO: 88.7 FL — SIGNIFICANT CHANGE UP (ref 80–100)
NRBC # BLD: 0 /100 WBCS — SIGNIFICANT CHANGE UP (ref 0–0)
NRBC # BLD: 0 /100 WBCS — SIGNIFICANT CHANGE UP (ref 0–0)
PHOSPHATE SERPL-MCNC: 2.8 MG/DL — SIGNIFICANT CHANGE UP (ref 2.5–4.5)
PLATELET # BLD AUTO: 210 K/UL — SIGNIFICANT CHANGE UP (ref 150–400)
PLATELET # BLD AUTO: 217 K/UL — SIGNIFICANT CHANGE UP (ref 150–400)
POTASSIUM SERPL-MCNC: 3.6 MMOL/L — SIGNIFICANT CHANGE UP (ref 3.5–5.3)
POTASSIUM SERPL-SCNC: 3.6 MMOL/L — SIGNIFICANT CHANGE UP (ref 3.5–5.3)
RBC # BLD: 4.15 M/UL — SIGNIFICANT CHANGE UP (ref 3.8–5.2)
RBC # BLD: 4.28 M/UL — SIGNIFICANT CHANGE UP (ref 3.8–5.2)
RBC # FLD: 13.2 % — SIGNIFICANT CHANGE UP (ref 10.3–14.5)
RBC # FLD: 13.8 % — SIGNIFICANT CHANGE UP (ref 10.3–14.5)
SODIUM SERPL-SCNC: 142 MMOL/L — SIGNIFICANT CHANGE UP (ref 135–145)
WBC # BLD: 7.02 K/UL — SIGNIFICANT CHANGE UP (ref 3.8–10.5)
WBC # BLD: 8.13 K/UL — SIGNIFICANT CHANGE UP (ref 3.8–10.5)
WBC # FLD AUTO: 7.02 K/UL — SIGNIFICANT CHANGE UP (ref 3.8–10.5)
WBC # FLD AUTO: 8.13 K/UL — SIGNIFICANT CHANGE UP (ref 3.8–10.5)

## 2022-01-06 PROCEDURE — 99233 SBSQ HOSP IP/OBS HIGH 50: CPT | Mod: GC

## 2022-01-06 PROCEDURE — 99232 SBSQ HOSP IP/OBS MODERATE 35: CPT

## 2022-01-06 RX ORDER — INSULIN LISPRO 100/ML
VIAL (ML) SUBCUTANEOUS
Refills: 0 | Status: DISCONTINUED | OUTPATIENT
Start: 2022-01-06 | End: 2022-01-11

## 2022-01-06 RX ORDER — DEXTROSE 50 % IN WATER 50 %
25 SYRINGE (ML) INTRAVENOUS ONCE
Refills: 0 | Status: DISCONTINUED | OUTPATIENT
Start: 2022-01-06 | End: 2022-01-11

## 2022-01-06 RX ORDER — GLUCAGON INJECTION, SOLUTION 0.5 MG/.1ML
1 INJECTION, SOLUTION SUBCUTANEOUS ONCE
Refills: 0 | Status: DISCONTINUED | OUTPATIENT
Start: 2022-01-06 | End: 2022-01-11

## 2022-01-06 RX ADMIN — Medication 3: at 21:22

## 2022-01-06 RX ADMIN — MEMANTINE HYDROCHLORIDE 10 MILLIGRAM(S): 10 TABLET ORAL at 12:06

## 2022-01-06 RX ADMIN — Medication 2: at 17:02

## 2022-01-06 RX ADMIN — DONEPEZIL HYDROCHLORIDE 10 MILLIGRAM(S): 10 TABLET, FILM COATED ORAL at 21:21

## 2022-01-06 RX ADMIN — ESCITALOPRAM OXALATE 10 MILLIGRAM(S): 10 TABLET, FILM COATED ORAL at 12:06

## 2022-01-06 RX ADMIN — ATORVASTATIN CALCIUM 80 MILLIGRAM(S): 80 TABLET, FILM COATED ORAL at 21:21

## 2022-01-06 RX ADMIN — Medication 100 MICROGRAM(S): at 05:41

## 2022-01-06 NOTE — PROGRESS NOTE ADULT - PROBLEM SELECTOR PLAN 1
- FOBT pos  hb stable and WNL   s/p NS 2L bolus, 2u pRBC, kcentra, plasma, and 1u platelet in ED  - monitor CBC q6h  - NPO after midnight  - EGD tmw   - clear liq diet and NPO after midnight   - GI, Dr. Dalton, onboard - FOBT pos  hb stable and WNL   s/p NS 2L bolus, 2u pRBC, kcentra, plasma, and 1u platelet in ED  - monitor CBC q6h  - NPO after midnight  - EGD today  - clear liq diet   - GI, Dr. Dalton, onboard  PT consulted - rec home PT

## 2022-01-06 NOTE — PROGRESS NOTE ADULT - ATTENDING COMMENTS
86yoF, w/ AAOx1-2 and ambulates w/ assistance only at baseline, w/ PMH of dementia, DM, HTN, afib (on Eliquis), permanent pacemaker, p/w low systolic blood pressure of low 70's. Admitted for Upper GI bleed on eliquis.    #UGIB  #Afib on eliquis  #IZAIAH  #pacemaker  #Dementia  #DM, HTN  - Hgb stable. NPO MN for possible EGD after discussion with daughter and GI  - Will decide on AC pending EGD results  - Holding metoprolol and eliquis in setting of GI bleed  - SSI  - f/u GI 86yoF, w/ AAOx1-2 and ambulates w/ assistance only at baseline, w/ PMH of dementia, DM, HTN, afib (on Eliquis), permanent pacemaker, p/w low systolic blood pressure of low 70's. Admitted for Upper GI bleed on eliquis.    #UGIB  #Afib on eliquis  #IZAIAH  #pacemaker  #Dementia  #DM, HTN  - Hgb stable. NPO MN for possible EGD after discussion with daughter and GI  - Will decide on AC pending EGD decision  - Holding metoprolol and eliquis in setting of GI bleed  - SSI  - f/u GI

## 2022-01-06 NOTE — PROGRESS NOTE ADULT - SUBJECTIVE AND OBJECTIVE BOX
PGY-1 Progress Note discussed with attending    PAGER #: [[phone]] TILL 5:00 PM  PLEASE CONTACT ON CALL TEAM:  - On Call Team (Please refer to Peter) FROM 5:00 PM - 8:30PM  - Nightfloat Team FROM 8:30 -7:30 AM    INTERVAL HPI/OVERNIGHT EVENTS:   Patient is laying comfortably in bed, complaining of feeling dizzy and a headache. Pt does not elaborate further and unable to participate in rest of ROS d/t MS.    REVIEW OF SYSTEMS:  as stated in hpi    MEDICATIONS  (STANDING):  atorvastatin 80 milliGRAM(s) Oral at bedtime  donepezil 10 milliGRAM(s) Oral at bedtime  ergocalciferol 48111 Unit(s) Oral <User Schedule>  escitalopram 10 milliGRAM(s) Oral daily  levothyroxine 100 MICROGram(s) Oral daily  memantine 10 milliGRAM(s) Oral daily  pantoprazole  Injectable 40 milliGRAM(s) IV Push two times a day    MEDICATIONS  (PRN):      Vital Signs Last 24 Hrs  T(C): 36.2 (06 Jan 2022 11:04), Max: 36.6 (06 Jan 2022 04:05)  T(F): 97.1 (06 Jan 2022 11:04), Max: 97.9 (06 Jan 2022 04:05)  HR: 60 (06 Jan 2022 11:04) (57 - 68)  BP: 136/62 (06 Jan 2022 11:04) (126/44 - 153/59)  BP(mean): --  RR: 19 (06 Jan 2022 11:04) (16 - 20)  SpO2: 90% (06 Jan 2022 11:04) (90% - 100%)    PHYSICAL EXAMINATION:  GENERAL: NAD,   HEAD: AT/NC  EYES: conjunctiva and sclera clear  NECK: supple, No JVD noted, Normal thyroid  CHEST/LUNG: CTABL; no rales, rhonchi, wheezing, or rubs  HEART: regular rate and rhythm; no murmurs, rubs, or gallops  ABDOMEN: soft, nontender, nondistended; Bowel sounds present  EXTREMITIES:  2+ Peripheral Pulses, No clubbing, cyanosis, or edema  SKIN: warm dry                          12.4   8.13  )-----------( 210      ( 06 Jan 2022 06:29 )             37.2     01-06    142  |  104  |  33<H>  ----------------------------<  221<H>  3.6   |  28  |  1.23    Ca    9.3      06 Jan 2022 06:29  Phos  2.8     01-06  Mg     2.0     01-06              COVID-19 PCR: Miritejosy (04 Jan 2022 16:55)      CAPILLARY BLOOD GLUCOSE      POCT Blood Glucose.: 250 mg/dL (06 Jan 2022 11:38)  POCT Blood Glucose.: 194 mg/dL (06 Jan 2022 07:23)  POCT Blood Glucose.: 182 mg/dL (05 Jan 2022 21:20)  POCT Blood Glucose.: 149 mg/dL (05 Jan 2022 16:11)      RADIOLOGY & ADDITIONAL TESTS:

## 2022-01-06 NOTE — PROGRESS NOTE ADULT - SUBJECTIVE AND OBJECTIVE BOX
Interval:  Pt unable to contribute to history. No reports of melena or hematochezia per nursing. No vomiting. Hb has been stable.    MEDICATIONS:  atorvastatin 80 milliGRAM(s) Oral at bedtime  dextrose 50% Injectable 25 Gram(s) IV Push once  donepezil 10 milliGRAM(s) Oral at bedtime  ergocalciferol 78906 Unit(s) Oral <User Schedule>  escitalopram 10 milliGRAM(s) Oral daily  glucagon  Injectable 1 milliGRAM(s) IntraMuscular once  insulin lispro (ADMELOG) corrective regimen sliding scale   SubCutaneous Before meals and at bedtime  levothyroxine 100 MICROGram(s) Oral daily  memantine 10 milliGRAM(s) Oral daily  pantoprazole  Injectable 40 milliGRAM(s) IV Push two times a day      ALLERGIES:  No Known Allergies      REVIEW OF SYSTEMS:  CONSTITUTIONAL: No weakness, fevers or chills.  EYES/ENT: No visual changes;  No vertigo or throat pain.  NECK: No pain or stiffness.  RESPIRATORY: No cough, wheezing, hemoptysis; No shortness of breath.  CARDIOVASCULAR: No chest pain or palpitations.  GASTROINTESTINAL: As per HPI.   GENITOURINARY: No dysuria, frequency or hematuria.  NEUROLOGICAL: No numbness or weakness.  SKIN: No itching, rashes.      PHYSICAL EXAM:  VITAL SIGNS:  T(C): 36.3 (01-06-22 @ 16:27), Max: 36.6 (01-06-22 @ 04:05)  HR: 61 (01-06-22 @ 16:27) (57 - 61)  BP: 141/51 (01-06-22 @ 16:27) (126/44 - 141/51)  RR: 20 (01-06-22 @ 16:27) (18 - 20)  SpO2: 99% (01-06-22 @ 16:27) (90% - 100%)  I/Os:     01-05-22 @ 07:01  -  01-06-22 @ 07:00  --------------------------------------------------------  IN: 0 mL / OUT: 1000 mL / NET: -1000 mL    01-06-22 @ 07:01  -  01-06-22 @ 19:59  --------------------------------------------------------  IN: 0 mL / OUT: 700 mL / NET: -700 mL      GENERAL: NAD, lying in bed comfortably.  HEAD:  Atraumatic, Normocephalic.  EYES: EOMI, normal conjunctiva, no scleral icterus.  ENT: Moist mucous membranes.  NECK: Supple.  CHEST/LUNG: Clear to auscultation bilaterally; No rales, rhonchi, wheezing, or rubs. Unlabored respirations.  HEART: Regular rate and rhythm; No murmurs, rubs, or gallops.  ABDOMEN: BSx4; Soft, nontender, nondistended.  EXTREMITIES:  No edema.  NERVOUS SYSTEM:  A&Ox1, no obvious focal deficits.  SKIN: No rashes or lesions.      LABS:                         12.4   8.13  )-----------( 210      ( 06 Jan 2022 06:29 )             37.2     01-06    142  |  104  |  33<H>  ----------------------------<  221<H>  3.6   |  28  |  1.23    Ca    9.3      06 Jan 2022 06:29  Phos  2.8     01-06  Mg     2.0     01-06            RADIOLOGY & ADDITIONAL TESTS: Reviewed.

## 2022-01-07 ENCOUNTER — TRANSCRIPTION ENCOUNTER (OUTPATIENT)
Age: 87
End: 2022-01-07

## 2022-01-07 ENCOUNTER — RESULT REVIEW (OUTPATIENT)
Age: 87
End: 2022-01-07

## 2022-01-07 LAB
GLUCOSE BLDC GLUCOMTR-MCNC: 213 MG/DL — HIGH (ref 70–99)
GLUCOSE BLDC GLUCOMTR-MCNC: 249 MG/DL — HIGH (ref 70–99)
GLUCOSE BLDC GLUCOMTR-MCNC: 263 MG/DL — HIGH (ref 70–99)
GLUCOSE BLDC GLUCOMTR-MCNC: 314 MG/DL — HIGH (ref 70–99)
HCT VFR BLD CALC: 36.4 % — SIGNIFICANT CHANGE UP (ref 34.5–45)
HGB BLD-MCNC: 11.9 G/DL — SIGNIFICANT CHANGE UP (ref 11.5–15.5)
MCHC RBC-ENTMCNC: 28.5 PG — SIGNIFICANT CHANGE UP (ref 27–34)
MCHC RBC-ENTMCNC: 32.7 GM/DL — SIGNIFICANT CHANGE UP (ref 32–36)
MCV RBC AUTO: 87.3 FL — SIGNIFICANT CHANGE UP (ref 80–100)
NRBC # BLD: 0 /100 WBCS — SIGNIFICANT CHANGE UP (ref 0–0)
PLATELET # BLD AUTO: 192 K/UL — SIGNIFICANT CHANGE UP (ref 150–400)
RBC # BLD: 4.17 M/UL — SIGNIFICANT CHANGE UP (ref 3.8–5.2)
RBC # FLD: 13.4 % — SIGNIFICANT CHANGE UP (ref 10.3–14.5)
WBC # BLD: 6.35 K/UL — SIGNIFICANT CHANGE UP (ref 3.8–10.5)
WBC # FLD AUTO: 6.35 K/UL — SIGNIFICANT CHANGE UP (ref 3.8–10.5)

## 2022-01-07 PROCEDURE — 43255 EGD CONTROL BLEEDING ANY: CPT

## 2022-01-07 PROCEDURE — 88312 SPECIAL STAINS GROUP 1: CPT | Mod: 26

## 2022-01-07 PROCEDURE — 99233 SBSQ HOSP IP/OBS HIGH 50: CPT

## 2022-01-07 PROCEDURE — 88305 TISSUE EXAM BY PATHOLOGIST: CPT | Mod: 26

## 2022-01-07 DEVICE — SET OTSC SYSTEM 11/6A: Type: IMPLANTABLE DEVICE | Status: FUNCTIONAL

## 2022-01-07 DEVICE — CLIP RESOLUTION 360 235CM: Type: IMPLANTABLE DEVICE | Status: FUNCTIONAL

## 2022-01-07 RX ORDER — ACETAMINOPHEN 500 MG
650 TABLET ORAL EVERY 6 HOURS
Refills: 0 | Status: DISCONTINUED | OUTPATIENT
Start: 2022-01-07 | End: 2022-01-11

## 2022-01-07 RX ADMIN — MEMANTINE HYDROCHLORIDE 10 MILLIGRAM(S): 10 TABLET ORAL at 11:57

## 2022-01-07 RX ADMIN — Medication 3: at 16:57

## 2022-01-07 RX ADMIN — ESCITALOPRAM OXALATE 10 MILLIGRAM(S): 10 TABLET, FILM COATED ORAL at 11:57

## 2022-01-07 RX ADMIN — Medication 650 MILLIGRAM(S): at 09:45

## 2022-01-07 RX ADMIN — Medication 650 MILLIGRAM(S): at 08:47

## 2022-01-07 RX ADMIN — Medication 2: at 21:41

## 2022-01-07 RX ADMIN — PANTOPRAZOLE SODIUM 40 MILLIGRAM(S): 20 TABLET, DELAYED RELEASE ORAL at 17:10

## 2022-01-07 RX ADMIN — DONEPEZIL HYDROCHLORIDE 10 MILLIGRAM(S): 10 TABLET, FILM COATED ORAL at 21:41

## 2022-01-07 RX ADMIN — Medication 100 MICROGRAM(S): at 05:46

## 2022-01-07 RX ADMIN — ATORVASTATIN CALCIUM 80 MILLIGRAM(S): 80 TABLET, FILM COATED ORAL at 21:41

## 2022-01-07 NOTE — PROGRESS NOTE ADULT - SUBJECTIVE AND OBJECTIVE BOX
[facility] Medicine  Patient is a 86y old  Female who presents with a chief complaint of GI bleed (06 Jan 2022 19:59)      SUBJECTIVE / OVERNIGHT EVENTS:  No acute events over night. NPO overnight, plan for EG today. Denies any fevers/chills, headache, CP, SOB, abd pain, N/V/D, constipation, or leg swelling.       MEDICATIONS  (STANDING):  atorvastatin 80 milliGRAM(s) Oral at bedtime  dextrose 50% Injectable 25 Gram(s) IV Push once  donepezil 10 milliGRAM(s) Oral at bedtime  ergocalciferol 10631 Unit(s) Oral <User Schedule>  escitalopram 10 milliGRAM(s) Oral daily  glucagon  Injectable 1 milliGRAM(s) IntraMuscular once  insulin lispro (ADMELOG) corrective regimen sliding scale   SubCutaneous Before meals and at bedtime  levothyroxine 100 MICROGram(s) Oral daily  memantine 10 milliGRAM(s) Oral daily  pantoprazole  Injectable 40 milliGRAM(s) IV Push two times a day    MEDICATIONS  (PRN):  acetaminophen     Tablet .. 650 milliGRAM(s) Oral every 6 hours PRN Temp greater or equal to 38C (100.4F), Mild Pain (1 - 3), Moderate Pain (4 - 6)          OBJECTIVE:  Vital Signs Last 24 Hrs  T(C): 36.8 (08 Jan 2022 11:12), Max: 37.1 (07 Jan 2022 23:35)  T(F): 98.3 (08 Jan 2022 11:12), Max: 98.7 (07 Jan 2022 23:35)  HR: 60 (08 Jan 2022 11:12) (58 - 112)  BP: 117/52 (08 Jan 2022 11:12) (99/41 - 117/52)  BP(mean): 57 (07 Jan 2022 15:58) (57 - 57)  RR: 19 (08 Jan 2022 11:12) (18 - 20)  SpO2: 98% (08 Jan 2022 11:12) (95% - 98%)    PHYSICAL EXAM:  GENERAL: NAD, well-developed  HEAD:  Atraumatic, Normocephalic  EYES: conjunctiva and sclera clear  NECK: Supple, No JVD  CHEST/LUNG: Clear to auscultation bilaterally; No wheeze  HEART: Regular rate and rhythm; No murmurs, rubs, or gallops  ABDOMEN: Soft, Nontender, Nondistended; Bowel sounds present  EXTREMITIES:  No clubbing, cyanosis, or edema  PSYCH: AAOx3  NEUROLOGY: non-focal  SKIN: No rashes or lesions    CAPILLARY BLOOD GLUCOSE      POCT Blood Glucose.: 257 mg/dL (08 Jan 2022 11:44)  POCT Blood Glucose.: 292 mg/dL (08 Jan 2022 07:31)  POCT Blood Glucose.: 213 mg/dL (07 Jan 2022 21:18)  POCT Blood Glucose.: 263 mg/dL (07 Jan 2022 16:32)    I&O's Summary    07 Jan 2022 07:01  -  08 Jan 2022 07:00  --------------------------------------------------------  IN: 0 mL / OUT: 800 mL / NET: -800 mL              LABS:                        11.9   6.35  )-----------( 192      ( 07 Jan 2022 07:01 )             36.4                         RADIOLOGY & ADDITIONAL TESTS:       [facility] Medicine  Patient is a 86y old  Female who presents with a chief complaint of GI bleed (06 Jan 2022 19:59)      SUBJECTIVE / OVERNIGHT EVENTS:  No acute events over night. NPO overnight, plan for EG today. Denies any fevers/chills, headache, CP, SOB, abd pain, N/V/D, constipation, or leg swelling.       MEDICATIONS  (STANDING):  atorvastatin 80 milliGRAM(s) Oral at bedtime  dextrose 50% Injectable 25 Gram(s) IV Push once  donepezil 10 milliGRAM(s) Oral at bedtime  ergocalciferol 90425 Unit(s) Oral <User Schedule>  escitalopram 10 milliGRAM(s) Oral daily  glucagon  Injectable 1 milliGRAM(s) IntraMuscular once  insulin lispro (ADMELOG) corrective regimen sliding scale   SubCutaneous Before meals and at bedtime  levothyroxine 100 MICROGram(s) Oral daily  memantine 10 milliGRAM(s) Oral daily  pantoprazole  Injectable 40 milliGRAM(s) IV Push two times a day    MEDICATIONS  (PRN):  acetaminophen     Tablet .. 650 milliGRAM(s) Oral every 6 hours PRN Temp greater or equal to 38C (100.4F), Mild Pain (1 - 3), Moderate Pain (4 - 6)          OBJECTIVE:  Vital Signs Last 24 Hrs  T(C): 36.8 (08 Jan 2022 11:12), Max: 37.1 (07 Jan 2022 23:35)  T(F): 98.3 (08 Jan 2022 11:12), Max: 98.7 (07 Jan 2022 23:35)  HR: 60 (08 Jan 2022 11:12) (58 - 112)  BP: 117/52 (08 Jan 2022 11:12) (99/41 - 117/52)  BP(mean): 57 (07 Jan 2022 15:58) (57 - 57)  RR: 19 (08 Jan 2022 11:12) (18 - 20)  SpO2: 98% (08 Jan 2022 11:12) (95% - 98%)    PHYSICAL EXAMINATION:  GENERAL: NAD,   HEAD: AT/NC  EYES: conjunctiva and sclera clear  NECK: supple, No JVD noted, Normal thyroid  CHEST/LUNG: CTABL; no rales, rhonchi, wheezing, or rubs  HEART: regular rate and rhythm; no murmurs, rubs, or gallops  ABDOMEN: soft, nontender, nondistended; Bowel sounds present  EXTREMITIES:  2+ Peripheral Pulses, No clubbing, cyanosis, or edema  SKIN: warm dry      CAPILLARY BLOOD GLUCOSE      POCT Blood Glucose.: 257 mg/dL (08 Jan 2022 11:44)  POCT Blood Glucose.: 292 mg/dL (08 Jan 2022 07:31)  POCT Blood Glucose.: 213 mg/dL (07 Jan 2022 21:18)  POCT Blood Glucose.: 263 mg/dL (07 Jan 2022 16:32)    I&O's Summary    07 Jan 2022 07:01  -  08 Jan 2022 07:00  --------------------------------------------------------  IN: 0 mL / OUT: 800 mL / NET: -800 mL              LABS:                        11.9   6.35  )-----------( 192      ( 07 Jan 2022 07:01 )             36.4                         RADIOLOGY & ADDITIONAL TESTS:

## 2022-01-08 LAB
ANION GAP SERPL CALC-SCNC: 6 MMOL/L — SIGNIFICANT CHANGE UP (ref 5–17)
BUN SERPL-MCNC: 31 MG/DL — HIGH (ref 7–18)
CALCIUM SERPL-MCNC: 9.4 MG/DL — SIGNIFICANT CHANGE UP (ref 8.4–10.5)
CHLORIDE SERPL-SCNC: 107 MMOL/L — SIGNIFICANT CHANGE UP (ref 96–108)
CO2 SERPL-SCNC: 29 MMOL/L — SIGNIFICANT CHANGE UP (ref 22–31)
CREAT SERPL-MCNC: 1.56 MG/DL — HIGH (ref 0.5–1.3)
GLUCOSE BLDC GLUCOMTR-MCNC: 110 MG/DL — HIGH (ref 70–99)
GLUCOSE BLDC GLUCOMTR-MCNC: 149 MG/DL — HIGH (ref 70–99)
GLUCOSE BLDC GLUCOMTR-MCNC: 257 MG/DL — HIGH (ref 70–99)
GLUCOSE BLDC GLUCOMTR-MCNC: 292 MG/DL — HIGH (ref 70–99)
GLUCOSE SERPL-MCNC: 239 MG/DL — HIGH (ref 70–99)
HCT VFR BLD CALC: 34.5 % — SIGNIFICANT CHANGE UP (ref 34.5–45)
HGB BLD-MCNC: 11 G/DL — LOW (ref 11.5–15.5)
MCHC RBC-ENTMCNC: 28.8 PG — SIGNIFICANT CHANGE UP (ref 27–34)
MCHC RBC-ENTMCNC: 31.9 GM/DL — LOW (ref 32–36)
MCV RBC AUTO: 90.3 FL — SIGNIFICANT CHANGE UP (ref 80–100)
NRBC # BLD: 0 /100 WBCS — SIGNIFICANT CHANGE UP (ref 0–0)
PLATELET # BLD AUTO: 168 K/UL — SIGNIFICANT CHANGE UP (ref 150–400)
POTASSIUM SERPL-MCNC: 4.1 MMOL/L — SIGNIFICANT CHANGE UP (ref 3.5–5.3)
POTASSIUM SERPL-SCNC: 4.1 MMOL/L — SIGNIFICANT CHANGE UP (ref 3.5–5.3)
RBC # BLD: 3.82 M/UL — SIGNIFICANT CHANGE UP (ref 3.8–5.2)
RBC # FLD: 13.2 % — SIGNIFICANT CHANGE UP (ref 10.3–14.5)
SODIUM SERPL-SCNC: 142 MMOL/L — SIGNIFICANT CHANGE UP (ref 135–145)
WBC # BLD: 7.24 K/UL — SIGNIFICANT CHANGE UP (ref 3.8–10.5)
WBC # FLD AUTO: 7.24 K/UL — SIGNIFICANT CHANGE UP (ref 3.8–10.5)

## 2022-01-08 PROCEDURE — 99233 SBSQ HOSP IP/OBS HIGH 50: CPT

## 2022-01-08 RX ADMIN — MEMANTINE HYDROCHLORIDE 10 MILLIGRAM(S): 10 TABLET ORAL at 12:19

## 2022-01-08 RX ADMIN — DONEPEZIL HYDROCHLORIDE 10 MILLIGRAM(S): 10 TABLET, FILM COATED ORAL at 21:53

## 2022-01-08 RX ADMIN — Medication 100 MICROGRAM(S): at 06:08

## 2022-01-08 RX ADMIN — PANTOPRAZOLE SODIUM 40 MILLIGRAM(S): 20 TABLET, DELAYED RELEASE ORAL at 06:08

## 2022-01-08 RX ADMIN — ATORVASTATIN CALCIUM 80 MILLIGRAM(S): 80 TABLET, FILM COATED ORAL at 21:53

## 2022-01-08 RX ADMIN — Medication 3: at 08:26

## 2022-01-08 RX ADMIN — Medication 3: at 12:19

## 2022-01-08 RX ADMIN — PANTOPRAZOLE SODIUM 40 MILLIGRAM(S): 20 TABLET, DELAYED RELEASE ORAL at 17:20

## 2022-01-08 RX ADMIN — ESCITALOPRAM OXALATE 10 MILLIGRAM(S): 10 TABLET, FILM COATED ORAL at 12:19

## 2022-01-08 NOTE — PROGRESS NOTE ADULT - SUBJECTIVE AND OBJECTIVE BOX
[facility] Medicine  Patient is a 86y old  Female who presents with a chief complaint of GI bleed (07 Jan 2022 12:16)      SUBJECTIVE / OVERNIGHT EVENTS:  Tolerated EGD yesterday. No acute events over night. No signs of bleeding. Denies any fevers/chills, headache, CP, SOB, abd pain, N/V/D, constipation, or leg swelling.       MEDICATIONS  (STANDING):  atorvastatin 80 milliGRAM(s) Oral at bedtime  dextrose 50% Injectable 25 Gram(s) IV Push once  donepezil 10 milliGRAM(s) Oral at bedtime  ergocalciferol 45177 Unit(s) Oral <User Schedule>  escitalopram 10 milliGRAM(s) Oral daily  glucagon  Injectable 1 milliGRAM(s) IntraMuscular once  insulin lispro (ADMELOG) corrective regimen sliding scale   SubCutaneous Before meals and at bedtime  levothyroxine 100 MICROGram(s) Oral daily  memantine 10 milliGRAM(s) Oral daily  pantoprazole  Injectable 40 milliGRAM(s) IV Push two times a day    MEDICATIONS  (PRN):  acetaminophen     Tablet .. 650 milliGRAM(s) Oral every 6 hours PRN Temp greater or equal to 38C (100.4F), Mild Pain (1 - 3), Moderate Pain (4 - 6)          OBJECTIVE:  Vital Signs Last 24 Hrs  T(C): 36.8 (08 Jan 2022 11:12), Max: 37.1 (07 Jan 2022 23:35)  T(F): 98.3 (08 Jan 2022 11:12), Max: 98.7 (07 Jan 2022 23:35)  HR: 60 (08 Jan 2022 11:12) (58 - 112)  BP: 117/52 (08 Jan 2022 11:12) (99/41 - 117/52)  BP(mean): 57 (07 Jan 2022 15:58) (57 - 57)  RR: 19 (08 Jan 2022 11:12) (18 - 20)  SpO2: 98% (08 Jan 2022 11:12) (95% - 98%)    PHYSICAL EXAMINATION:  GENERAL: NAD,   HEAD: AT/NC  EYES: conjunctiva and sclera clear  NECK: supple, No JVD noted, Normal thyroid  CHEST/LUNG: CTABL; no rales, rhonchi, wheezing, or rubs  HEART: regular rate and rhythm; no murmurs, rubs, or gallops  ABDOMEN: soft, nontender, nondistended; Bowel sounds present  EXTREMITIES:  2+ Peripheral Pulses, No clubbing, cyanosis, or edema  SKIN: warm dry    CAPILLARY BLOOD GLUCOSE      POCT Blood Glucose.: 257 mg/dL (08 Jan 2022 11:44)  POCT Blood Glucose.: 292 mg/dL (08 Jan 2022 07:31)  POCT Blood Glucose.: 213 mg/dL (07 Jan 2022 21:18)  POCT Blood Glucose.: 263 mg/dL (07 Jan 2022 16:32)    I&O's Summary    07 Jan 2022 07:01  -  08 Jan 2022 07:00  --------------------------------------------------------  IN: 0 mL / OUT: 800 mL / NET: -800 mL              LABS:                        11.9   6.35  )-----------( 192      ( 07 Jan 2022 07:01 )             36.4                         RADIOLOGY & ADDITIONAL TESTS:

## 2022-01-09 LAB
ANION GAP SERPL CALC-SCNC: 5 MMOL/L — SIGNIFICANT CHANGE UP (ref 5–17)
BUN SERPL-MCNC: 28 MG/DL — HIGH (ref 7–18)
CALCIUM SERPL-MCNC: 9.5 MG/DL — SIGNIFICANT CHANGE UP (ref 8.4–10.5)
CHLORIDE SERPL-SCNC: 103 MMOL/L — SIGNIFICANT CHANGE UP (ref 96–108)
CO2 SERPL-SCNC: 31 MMOL/L — SIGNIFICANT CHANGE UP (ref 22–31)
CREAT SERPL-MCNC: 1.34 MG/DL — HIGH (ref 0.5–1.3)
CULTURE RESULTS: SIGNIFICANT CHANGE UP
CULTURE RESULTS: SIGNIFICANT CHANGE UP
GLUCOSE BLDC GLUCOMTR-MCNC: 172 MG/DL — HIGH (ref 70–99)
GLUCOSE BLDC GLUCOMTR-MCNC: 242 MG/DL — HIGH (ref 70–99)
GLUCOSE BLDC GLUCOMTR-MCNC: 247 MG/DL — HIGH (ref 70–99)
GLUCOSE BLDC GLUCOMTR-MCNC: 287 MG/DL — HIGH (ref 70–99)
GLUCOSE SERPL-MCNC: 265 MG/DL — HIGH (ref 70–99)
HCT VFR BLD CALC: 35.4 % — SIGNIFICANT CHANGE UP (ref 34.5–45)
HGB BLD-MCNC: 11.3 G/DL — LOW (ref 11.5–15.5)
MAGNESIUM SERPL-MCNC: 1.9 MG/DL — SIGNIFICANT CHANGE UP (ref 1.6–2.6)
MCHC RBC-ENTMCNC: 29 PG — SIGNIFICANT CHANGE UP (ref 27–34)
MCHC RBC-ENTMCNC: 31.9 GM/DL — LOW (ref 32–36)
MCV RBC AUTO: 90.8 FL — SIGNIFICANT CHANGE UP (ref 80–100)
NRBC # BLD: 0 /100 WBCS — SIGNIFICANT CHANGE UP (ref 0–0)
PLATELET # BLD AUTO: 153 K/UL — SIGNIFICANT CHANGE UP (ref 150–400)
POTASSIUM SERPL-MCNC: 4.2 MMOL/L — SIGNIFICANT CHANGE UP (ref 3.5–5.3)
POTASSIUM SERPL-SCNC: 4.2 MMOL/L — SIGNIFICANT CHANGE UP (ref 3.5–5.3)
RBC # BLD: 3.9 M/UL — SIGNIFICANT CHANGE UP (ref 3.8–5.2)
RBC # FLD: 13.4 % — SIGNIFICANT CHANGE UP (ref 10.3–14.5)
SODIUM SERPL-SCNC: 139 MMOL/L — SIGNIFICANT CHANGE UP (ref 135–145)
SPECIMEN SOURCE: SIGNIFICANT CHANGE UP
SPECIMEN SOURCE: SIGNIFICANT CHANGE UP
WBC # BLD: 6.64 K/UL — SIGNIFICANT CHANGE UP (ref 3.8–10.5)
WBC # FLD AUTO: 6.64 K/UL — SIGNIFICANT CHANGE UP (ref 3.8–10.5)

## 2022-01-09 PROCEDURE — 99233 SBSQ HOSP IP/OBS HIGH 50: CPT

## 2022-01-09 RX ORDER — APIXABAN 2.5 MG/1
2.5 TABLET, FILM COATED ORAL EVERY 12 HOURS
Refills: 0 | Status: DISCONTINUED | OUTPATIENT
Start: 2022-01-09 | End: 2022-01-11

## 2022-01-09 RX ORDER — PANTOPRAZOLE SODIUM 20 MG/1
40 TABLET, DELAYED RELEASE ORAL
Refills: 0 | Status: DISCONTINUED | OUTPATIENT
Start: 2022-01-09 | End: 2022-01-11

## 2022-01-09 RX ADMIN — ATORVASTATIN CALCIUM 80 MILLIGRAM(S): 80 TABLET, FILM COATED ORAL at 21:42

## 2022-01-09 RX ADMIN — Medication 2: at 17:00

## 2022-01-09 RX ADMIN — PANTOPRAZOLE SODIUM 40 MILLIGRAM(S): 20 TABLET, DELAYED RELEASE ORAL at 05:45

## 2022-01-09 RX ADMIN — Medication 3: at 11:50

## 2022-01-09 RX ADMIN — ESCITALOPRAM OXALATE 10 MILLIGRAM(S): 10 TABLET, FILM COATED ORAL at 11:32

## 2022-01-09 RX ADMIN — Medication 1: at 21:42

## 2022-01-09 RX ADMIN — DONEPEZIL HYDROCHLORIDE 10 MILLIGRAM(S): 10 TABLET, FILM COATED ORAL at 21:41

## 2022-01-09 RX ADMIN — APIXABAN 2.5 MILLIGRAM(S): 2.5 TABLET, FILM COATED ORAL at 17:01

## 2022-01-09 RX ADMIN — Medication 100 MICROGRAM(S): at 05:45

## 2022-01-09 RX ADMIN — Medication 2: at 08:00

## 2022-01-09 RX ADMIN — MEMANTINE HYDROCHLORIDE 10 MILLIGRAM(S): 10 TABLET ORAL at 11:33

## 2022-01-09 NOTE — PROGRESS NOTE ADULT - SUBJECTIVE AND OBJECTIVE BOX
[facility] Medicine  Patient is a 86y old  Female who presents with a chief complaint of GI bleed (07 Jan 2022 12:16)      SUBJECTIVE / OVERNIGHT EVENTS:  Tolerated Clear liquid diet with no further bleeding. No acute events over night. No signs of bleeding. Denies any fevers/chills, headache, CP, SOB, abd pain, N/V/D, constipation, or leg swelling.       MEDICATIONS  (STANDING):  atorvastatin 80 milliGRAM(s) Oral at bedtime  dextrose 50% Injectable 25 Gram(s) IV Push once  donepezil 10 milliGRAM(s) Oral at bedtime  ergocalciferol 84347 Unit(s) Oral <User Schedule>  escitalopram 10 milliGRAM(s) Oral daily  glucagon  Injectable 1 milliGRAM(s) IntraMuscular once  insulin lispro (ADMELOG) corrective regimen sliding scale   SubCutaneous Before meals and at bedtime  levothyroxine 100 MICROGram(s) Oral daily  memantine 10 milliGRAM(s) Oral daily  pantoprazole  Injectable 40 milliGRAM(s) IV Push two times a day    MEDICATIONS  (PRN):  acetaminophen     Tablet .. 650 milliGRAM(s) Oral every 6 hours PRN Temp greater or equal to 38C (100.4F), Mild Pain (1 - 3), Moderate Pain (4 - 6)          OBJECTIVE:  Vital Signs Last 24 Hrs  T(C): 36.8 (08 Jan 2022 11:12), Max: 37.1 (07 Jan 2022 23:35)  T(F): 98.3 (08 Jan 2022 11:12), Max: 98.7 (07 Jan 2022 23:35)  HR: 60 (08 Jan 2022 11:12) (58 - 112)  BP: 117/52 (08 Jan 2022 11:12) (99/41 - 117/52)  BP(mean): 57 (07 Jan 2022 15:58) (57 - 57)  RR: 19 (08 Jan 2022 11:12) (18 - 20)  SpO2: 98% (08 Jan 2022 11:12) (95% - 98%)    PHYSICAL EXAMINATION:  GENERAL: NAD,   HEAD: AT/NC  EYES: conjunctiva and sclera clear  NECK: supple, No JVD noted, Normal thyroid  CHEST/LUNG: CTABL; no rales, rhonchi, wheezing, or rubs  HEART: regular rate and rhythm; no murmurs, rubs, or gallops  ABDOMEN: soft, nontender, nondistended; Bowel sounds present  EXTREMITIES:  2+ Peripheral Pulses, No clubbing, cyanosis, or edema  SKIN: warm dry    CAPILLARY BLOOD GLUCOSE      POCT Blood Glucose.: 257 mg/dL (08 Jan 2022 11:44)  POCT Blood Glucose.: 292 mg/dL (08 Jan 2022 07:31)  POCT Blood Glucose.: 213 mg/dL (07 Jan 2022 21:18)  POCT Blood Glucose.: 263 mg/dL (07 Jan 2022 16:32)    I&O's Summary    07 Jan 2022 07:01  -  08 Jan 2022 07:00  --------------------------------------------------------  IN: 0 mL / OUT: 800 mL / NET: -800 mL              LABS:                        11.9   6.35  )-----------( 192      ( 07 Jan 2022 07:01 )             36.4                         RADIOLOGY & ADDITIONAL TESTS:

## 2022-01-10 LAB
ALBUMIN SERPL ELPH-MCNC: 2.4 G/DL — LOW (ref 3.5–5)
ALP SERPL-CCNC: 47 U/L — SIGNIFICANT CHANGE UP (ref 40–120)
ALT FLD-CCNC: 15 U/L DA — SIGNIFICANT CHANGE UP (ref 10–60)
ANION GAP SERPL CALC-SCNC: 8 MMOL/L — SIGNIFICANT CHANGE UP (ref 5–17)
AST SERPL-CCNC: 13 U/L — SIGNIFICANT CHANGE UP (ref 10–40)
BILIRUB SERPL-MCNC: 0.8 MG/DL — SIGNIFICANT CHANGE UP (ref 0.2–1.2)
BUN SERPL-MCNC: 24 MG/DL — HIGH (ref 7–18)
CALCIUM SERPL-MCNC: 8.6 MG/DL — SIGNIFICANT CHANGE UP (ref 8.4–10.5)
CHLORIDE SERPL-SCNC: 102 MMOL/L — SIGNIFICANT CHANGE UP (ref 96–108)
CO2 SERPL-SCNC: 29 MMOL/L — SIGNIFICANT CHANGE UP (ref 22–31)
CREAT SERPL-MCNC: 1.38 MG/DL — HIGH (ref 0.5–1.3)
GLUCOSE BLDC GLUCOMTR-MCNC: 179 MG/DL — HIGH (ref 70–99)
GLUCOSE BLDC GLUCOMTR-MCNC: 224 MG/DL — HIGH (ref 70–99)
GLUCOSE BLDC GLUCOMTR-MCNC: 252 MG/DL — HIGH (ref 70–99)
GLUCOSE BLDC GLUCOMTR-MCNC: 353 MG/DL — HIGH (ref 70–99)
GLUCOSE SERPL-MCNC: 275 MG/DL — HIGH (ref 70–99)
HCT VFR BLD CALC: 33 % — LOW (ref 34.5–45)
HGB BLD-MCNC: 10.8 G/DL — LOW (ref 11.5–15.5)
MAGNESIUM SERPL-MCNC: 1.7 MG/DL — SIGNIFICANT CHANGE UP (ref 1.6–2.6)
MCHC RBC-ENTMCNC: 29.1 PG — SIGNIFICANT CHANGE UP (ref 27–34)
MCHC RBC-ENTMCNC: 32.7 GM/DL — SIGNIFICANT CHANGE UP (ref 32–36)
MCV RBC AUTO: 88.9 FL — SIGNIFICANT CHANGE UP (ref 80–100)
NRBC # BLD: 0 /100 WBCS — SIGNIFICANT CHANGE UP (ref 0–0)
PHOSPHATE SERPL-MCNC: 3.4 MG/DL — SIGNIFICANT CHANGE UP (ref 2.5–4.5)
PLATELET # BLD AUTO: 150 K/UL — SIGNIFICANT CHANGE UP (ref 150–400)
POTASSIUM SERPL-MCNC: 3.8 MMOL/L — SIGNIFICANT CHANGE UP (ref 3.5–5.3)
POTASSIUM SERPL-SCNC: 3.8 MMOL/L — SIGNIFICANT CHANGE UP (ref 3.5–5.3)
PROT SERPL-MCNC: 6 G/DL — SIGNIFICANT CHANGE UP (ref 6–8.3)
RBC # BLD: 3.71 M/UL — LOW (ref 3.8–5.2)
RBC # FLD: 13.2 % — SIGNIFICANT CHANGE UP (ref 10.3–14.5)
SODIUM SERPL-SCNC: 139 MMOL/L — SIGNIFICANT CHANGE UP (ref 135–145)
WBC # BLD: 7.34 K/UL — SIGNIFICANT CHANGE UP (ref 3.8–10.5)
WBC # FLD AUTO: 7.34 K/UL — SIGNIFICANT CHANGE UP (ref 3.8–10.5)

## 2022-01-10 PROCEDURE — 99233 SBSQ HOSP IP/OBS HIGH 50: CPT | Mod: GC

## 2022-01-10 PROCEDURE — 99232 SBSQ HOSP IP/OBS MODERATE 35: CPT

## 2022-01-10 RX ADMIN — APIXABAN 2.5 MILLIGRAM(S): 2.5 TABLET, FILM COATED ORAL at 05:26

## 2022-01-10 RX ADMIN — Medication 100 MICROGRAM(S): at 05:26

## 2022-01-10 RX ADMIN — MEMANTINE HYDROCHLORIDE 10 MILLIGRAM(S): 10 TABLET ORAL at 11:36

## 2022-01-10 RX ADMIN — ATORVASTATIN CALCIUM 80 MILLIGRAM(S): 80 TABLET, FILM COATED ORAL at 22:38

## 2022-01-10 RX ADMIN — Medication 5: at 17:00

## 2022-01-10 RX ADMIN — APIXABAN 2.5 MILLIGRAM(S): 2.5 TABLET, FILM COATED ORAL at 17:00

## 2022-01-10 RX ADMIN — Medication 1: at 08:10

## 2022-01-10 RX ADMIN — Medication 3: at 11:35

## 2022-01-10 RX ADMIN — DONEPEZIL HYDROCHLORIDE 10 MILLIGRAM(S): 10 TABLET, FILM COATED ORAL at 22:34

## 2022-01-10 RX ADMIN — ESCITALOPRAM OXALATE 10 MILLIGRAM(S): 10 TABLET, FILM COATED ORAL at 11:36

## 2022-01-10 RX ADMIN — Medication 2: at 20:57

## 2022-01-10 RX ADMIN — PANTOPRAZOLE SODIUM 40 MILLIGRAM(S): 20 TABLET, DELAYED RELEASE ORAL at 05:26

## 2022-01-10 NOTE — DIETITIAN INITIAL EVALUATION ADULT. - DIET TYPE
diet just advanced today per MD: May consider oral nutritional supplement if persistently decreased intake as medically feasible diet just advanced today per MD: May consider oral nutritional supplement if persistently decreased intake as medically feasible.

## 2022-01-10 NOTE — DIETITIAN INITIAL EVALUATION ADULT. - NUTRITIONGOAL OUTCOME1
"Chief Complaint   Patient presents with     Prenatal Care     15 weeks 2 days    early GCT today  HX GDM        Initial /76 (BP Location: Right arm, Cuff Size: Adult Regular)   Pulse 100   Wt 71.8 kg (158 lb 4.8 oz)   LMP 2020 (Exact Date)   Breastfeeding No   BMI 28.95 kg/m   Estimated body mass index is 28.95 kg/m  as calculated from the following:    Height as of 20: 1.575 m (5' 2\").    Weight as of this encounter: 71.8 kg (158 lb 4.8 oz).  BP completed using cuff size: regular    Questioned patient about current smoking habits.  Pt. has never smoked.    15w2d      The following HM Due: NONE        +Fatigued   +Early GCT today         Saray Navarrete, CMA on 2021 at 2:34 PM           "
To meet nutrition needs

## 2022-01-10 NOTE — PROGRESS NOTE ADULT - ATTENDING COMMENTS
86yoF, w/ AAOx1-2 and ambulates w/ assistance only at baseline, w/ PMH of dementia, DM, HTN, afib (on Eliquis), permanent pacemaker, p/w low systolic blood pressure of low 70's. Admitted for Upper GI bleed on eliquis.    #UGIB  #Afib on eliquis  #IZAIAH  #pacemaker  #Dementia  #DM, HTN  EGD: Upper GI bleed secondary to large gastric antral ulcer with visible vessel and contact bleeding. Complete hemostasis achieved with one 11 mm over-the-scope clip.   - Advance diet  - Follow-up pathology results.   - Monitor for any signs of recurring overt GI bleed  - Pantoprazole PO  - Hgb stable   - c/w eliquis 2.5mg BID  - SSI  - f/u GI  - f/u PT  - DC pending PO tolerance and PT eval

## 2022-01-10 NOTE — DIETITIAN INITIAL EVALUATION ADULT. - FACTORS AFF FOOD INTAKE
acute on chronic comorbidities including s/p GI bleeding, dementia/change in mental status/Sikhism/ethnic/cultural/personal food preferences

## 2022-01-10 NOTE — DIETITIAN INITIAL EVALUATION ADULT. - OTHER INFO
Pt lives home with HHA help PTA, alert, confused with dementia; Limited intake/wt change history data available at present; s/p GI bleeding; NPO/Clear Liquid diet x 4 to 5d in-house, full liquid x 1d, just advanced to sold food today; pt tolerating meals well, no GI distress reported at present

## 2022-01-10 NOTE — DIETITIAN INITIAL EVALUATION ADULT. - FEEDING ASSISTANCE
Nursing to continue feeding assistance and encouragement as needed; Provide food choices within diet Rx as available/updated Nursing to continue feeding assistance and encouragement as needed; Provide food choices within diet Rx as available/updated.

## 2022-01-10 NOTE — DIETITIAN INITIAL EVALUATION ADULT. - PERTINENT LABORATORY DATA
01-10 Na139 mmol/L Glu 275 mg/dL<H> K+ 3.8 mmol/L Cr  1.38 mg/dL<H> BUN 24 mg/dL<H>   01-10 Phos 3.4 mg/dL   01-10 Alb 2.4 g/dL<L>       01-05 Chol 109 mg/dL LDL --    HDL 38 mg/dL<L> Trig 241 mg/dL<H>  01-05-22 @ 09:19 HgbA1C 7.3 [4.0 - 5.6]

## 2022-01-10 NOTE — PROGRESS NOTE ADULT - SUBJECTIVE AND OBJECTIVE BOX
GI PROGRESS NOTE    Patient is a 86y old  Female who presents with a chief complaint of GI bleed (10 Balbir 2022 12:30)    Peruvian  ID [id number] Álvaro. Despite using Peruvian , patient unable to contribute. No melena or hematochezia reported per nursing. Hgb remains stable.       ______________________________________________________________________  MEDS:  MEDICATIONS  (STANDING):  apixaban 2.5 milliGRAM(s) Oral every 12 hours  atorvastatin 80 milliGRAM(s) Oral at bedtime  dextrose 50% Injectable 25 Gram(s) IV Push once  donepezil 10 milliGRAM(s) Oral at bedtime  ergocalciferol 83532 Unit(s) Oral <User Schedule>  escitalopram 10 milliGRAM(s) Oral daily  glucagon  Injectable 1 milliGRAM(s) IntraMuscular once  insulin lispro (ADMELOG) corrective regimen sliding scale   SubCutaneous Before meals and at bedtime  levothyroxine 100 MICROGram(s) Oral daily  memantine 10 milliGRAM(s) Oral daily  pantoprazole    Tablet 40 milliGRAM(s) Oral before breakfast    MEDICATIONS  (PRN):  acetaminophen     Tablet .. 650 milliGRAM(s) Oral every 6 hours PRN Temp greater or equal to 38C (100.4F), Mild Pain (1 - 3), Moderate Pain (4 - 6)    ______________________________________________________________________  ALL:   No Known Allergies    ______________________________________________________________________  ROS: Unable to obtain due to patient's mental status.       ______________________________________________________________________  PHYSICAL EXAM:  T(C): 36.4 (01-10-22 @ 16:17), Max: 37.2 (01-10-22 @ 07:58)  HR: 59 (01-10-22 @ 16:17)  BP: 136/58 (01-10-22 @ 16:17)  RR: 18 (01-10-22 @ 16:17)  SpO2: 98% (01-10-22 @ 16:17)  Wt(kg): --    01-09  -  01-10  --------------------------------------------------------  IN:  Total IN: 0 mL    OUT:    Voided (mL): 500 mL  Total OUT: 500 mL    Total NET: -500 mL          GEN: NAD   CVS- S1 S2  ABD: soft nontender, non distended, bowel sounds+  LUNGS: clear to auscultation  NEURO: alert but confused   Extremities: no cyanosis, no calf tenderness, no edema, no clubbing      ______________________________________________________________________  LABS:                        10.8   7.34  )-----------( 150      ( 10 Balbir 2022 10:03 )             33.0     01-10    139  |  102  |  24<H>  ----------------------------<  275<H>  3.8   |  29  |  1.38<H>    Ca    8.6      10 Balbir 2022 10:03  Phos  3.4     01-10  Mg     1.7     01-10    TPro  6.0  /  Alb  2.4<L>  /  TBili  0.8  /  DBili  x   /  AST  13  /  ALT  15  /  AlkPhos  47  01-10    LIVER FUNCTIONS - ( 10 Balbir 2022 10:03 )  Alb: 2.4 g/dL / Pro: 6.0 g/dL / ALK PHOS: 47 U/L / ALT: 15 U/L DA / AST: 13 U/L / GGT: x

## 2022-01-10 NOTE — PROGRESS NOTE ADULT - ATTENDING COMMENTS
- UGIB.  - Gastric ulcer s/p clipping.    Patient doing well. Abd soft, NTND. Unable to contribute to hx but per nursing, no further melena or coffee ground emesis. Hb overall stable. Restarted on AC. F/u path. PPI BID. Please call GI if any new issues arise.

## 2022-01-10 NOTE — PROGRESS NOTE ADULT - PROBLEM SELECTOR PLAN 1
- FOBT pos  hb stable and WNL   s/p NS 2L bolus, 2u pRBC, kcentra, plasma, and 1u platelet in ED  - monitor CBC q6h  - NPO after midnight  - EGD showing large antral ulcer with visible vessel and contact bleeding hemostasis achieved iwht clip  - advance diet as tolerated, on regular   - GI, Dr. Dalton, onboard  PT consulted - rec home PT

## 2022-01-10 NOTE — DIETITIAN INITIAL EVALUATION ADULT. - PERTINENT MEDS FT
MEDICATIONS  (STANDING):  apixaban 2.5 milliGRAM(s) Oral every 12 hours  atorvastatin 80 milliGRAM(s) Oral at bedtime  dextrose 50% Injectable 25 Gram(s) IV Push once  donepezil 10 milliGRAM(s) Oral at bedtime  ergocalciferol 00856 Unit(s) Oral <User Schedule>  escitalopram 10 milliGRAM(s) Oral daily  glucagon  Injectable 1 milliGRAM(s) IntraMuscular once  insulin lispro (ADMELOG) corrective regimen sliding scale   SubCutaneous Before meals and at bedtime  levothyroxine 100 MICROGram(s) Oral daily  memantine 10 milliGRAM(s) Oral daily  pantoprazole    Tablet 40 milliGRAM(s) Oral before breakfast

## 2022-01-10 NOTE — PROGRESS NOTE ADULT - SUBJECTIVE AND OBJECTIVE BOX
PGY1 Progress Note discussed with attending     PAGER #: [[phone]] TILL 5:00 PM  PLEASE CONTACT ON CALL TEAM:  - On Call Team (Please refer to Peter) FROM 5:00 PM - 8:30PM  - Nightfloat Team FROM 8:30 -7:30 AM    CHIEF COMPLAINT & BRIEF HOSPITAL COURSE:    INTERVAL HPI/OVERNIGHT EVENTS:       REVIEW OF SYSTEMS:  CONSTITUTIONAL: No fever, weight loss, or fatigue  RESPIRATORY: No cough, wheezing, chills or hemoptysis; No shortness of breath  CARDIOVASCULAR: No chest pain, palpitations, dizziness, or leg swelling  GASTROINTESTINAL: No abdominal pain. No nausea, vomiting, or hematemesis; No diarrhea or constipation. No melena or hematochezia.  GENITOURINARY: No dysuria or hematuria, urinary frequency  NEUROLOGICAL: No headaches, memory loss, loss of strength, numbness, or tremors  SKIN: No itching, burning, rashes, or lesions     Vital Signs Last 24 Hrs  T(C): 37.2 (10 Balbir 2022 07:58), Max: 37.2 (10 Balbir 2022 07:58)  T(F): 98.9 (10 Balbir 2022 07:58), Max: 98.9 (10 Balbir 2022 07:58)  HR: 60 (10 Balbir 2022 07:58) (59 - 65)  BP: 119/43 (10 Balbir 2022 07:58) (106/53 - 132/51)  BP(mean): --  RR: 19 (10 Balbir 2022 07:58) (18 - 20)  SpO2: 99% (10 Balbir 2022 07:58) (96% - 99%)    PHYSICAL EXAMINATION:  GENERAL: NAD, well built  HEAD:  Atraumatic, Normocephalic  EYES:  conjunctiva and sclera clear  NECK: Supple, No JVD, Normal thyroid  CHEST/LUNG: Clear to auscultation. Clear to percussion bilaterally; No rales, rhonchi, wheezing, or rubs  HEART: Regular rate and rhythm; No murmurs, rubs, or gallops  ABDOMEN: Soft, Nontender, Nondistended; Bowel sounds present  NERVOUS SYSTEM:  Alert & Oriented X3,    EXTREMITIES:  2+ Peripheral Pulses, No clubbing, cyanosis, or edema  SKIN: warm dry                          11.3   6.64  )-----------( 153      ( 09 Jan 2022 07:51 )             35.4     01-09    139  |  103  |  28<H>  ----------------------------<  265<H>  4.2   |  31  |  1.34<H>    Ca    9.5      09 Jan 2022 07:51  Mg     1.9     01-09                CAPILLARY BLOOD GLUCOSE      RADIOLOGY & ADDITIONAL TESTS:                   PGY1 Progress Note discussed with attending     PAGER #: [[phone]] TILL 5:00 PM  PLEASE CONTACT ON CALL TEAM:  - On Call Team (Please refer to Peter) FROM 5:00 PM - 8:30PM  - Nightfloat Team FROM 8:30 -7:30 AM    CHIEF COMPLAINT & BRIEF HOSPITAL COURSE:    INTERVAL HPI/OVERNIGHT EVENTS: No acute events noted overnight. Patient is Danish speaking ID [id number], patient denies any abdominal pain. Denies bleeding. Denies cp, sob.       REVIEW OF SYSTEMS:  CONSTITUTIONAL: No fever, weight loss, or fatigue  RESPIRATORY: No cough, wheezing, chills or hemoptysis; No shortness of breath  CARDIOVASCULAR: No chest pain, palpitations, dizziness, or leg swelling  GASTROINTESTINAL: No abdominal pain. No nausea, vomiting, or hematemesis; No diarrhea or constipation. No melena or hematochezia.  GENITOURINARY: No dysuria or hematuria, urinary frequency  NEUROLOGICAL: No headaches, memory loss, loss of strength, numbness, or tremors  SKIN: No itching, burning, rashes, or lesions     Vital Signs Last 24 Hrs  T(C): 37.2 (10 Balbir 2022 07:58), Max: 37.2 (10 Balbir 2022 07:58)  T(F): 98.9 (10 Balbir 2022 07:58), Max: 98.9 (10 Balbir 2022 07:58)  HR: 60 (10 Balbir 2022 07:58) (59 - 65)  BP: 119/43 (10 Balbir 2022 07:58) (106/53 - 132/51)  BP(mean): --  RR: 19 (10 Balbir 2022 07:58) (18 - 20)  SpO2: 99% (10 Balbir 2022 07:58) (96% - 99%)    PHYSICAL EXAMINATION:  GENERAL: NAD, well built  HEAD:  Atraumatic, Normocephalic  EYES:  conjunctiva and sclera clear  NECK: Supple, No JVD, Normal thyroid  CHEST/LUNG: Clear to auscultation. No rales, rhonchi, wheezing, or rubs  HEART: Regular rate and rhythm; No murmurs, rubs, or gallops  ABDOMEN: Soft, Nontender, Nondistended; Bowel sounds present  NERVOUS SYSTEM:  Alert & Oriented X3,    EXTREMITIES:  2+ Peripheral Pulses, No clubbing, cyanosis, or edema  SKIN: warm dry                          11.3   6.64  )-----------( 153      ( 09 Jan 2022 07:51 )             35.4     01-09    139  |  103  |  28<H>  ----------------------------<  265<H>  4.2   |  31  |  1.34<H>    Ca    9.5      09 Jan 2022 07:51  Mg     1.9     01-09                CAPILLARY BLOOD GLUCOSE      RADIOLOGY & ADDITIONAL TESTS:

## 2022-01-11 ENCOUNTER — TRANSCRIPTION ENCOUNTER (OUTPATIENT)
Age: 87
End: 2022-01-11

## 2022-01-11 VITALS
SYSTOLIC BLOOD PRESSURE: 128 MMHG | RESPIRATION RATE: 18 BRPM | HEART RATE: 59 BPM | OXYGEN SATURATION: 97 % | DIASTOLIC BLOOD PRESSURE: 57 MMHG

## 2022-01-11 LAB
ALBUMIN SERPL ELPH-MCNC: 2.9 G/DL — LOW (ref 3.5–5)
ALP SERPL-CCNC: 56 U/L — SIGNIFICANT CHANGE UP (ref 40–120)
ALT FLD-CCNC: 15 U/L DA — SIGNIFICANT CHANGE UP (ref 10–60)
ANION GAP SERPL CALC-SCNC: 7 MMOL/L — SIGNIFICANT CHANGE UP (ref 5–17)
AST SERPL-CCNC: 14 U/L — SIGNIFICANT CHANGE UP (ref 10–40)
BILIRUB SERPL-MCNC: 1.1 MG/DL — SIGNIFICANT CHANGE UP (ref 0.2–1.2)
BUN SERPL-MCNC: 19 MG/DL — HIGH (ref 7–18)
CALCIUM SERPL-MCNC: 9.1 MG/DL — SIGNIFICANT CHANGE UP (ref 8.4–10.5)
CHLORIDE SERPL-SCNC: 100 MMOL/L — SIGNIFICANT CHANGE UP (ref 96–108)
CO2 SERPL-SCNC: 30 MMOL/L — SIGNIFICANT CHANGE UP (ref 22–31)
CREAT SERPL-MCNC: 1.26 MG/DL — SIGNIFICANT CHANGE UP (ref 0.5–1.3)
GLUCOSE BLDC GLUCOMTR-MCNC: 141 MG/DL — HIGH (ref 70–99)
GLUCOSE BLDC GLUCOMTR-MCNC: 201 MG/DL — HIGH (ref 70–99)
GLUCOSE SERPL-MCNC: 211 MG/DL — HIGH (ref 70–99)
HCT VFR BLD CALC: 36.9 % — SIGNIFICANT CHANGE UP (ref 34.5–45)
HGB BLD-MCNC: 11.8 G/DL — SIGNIFICANT CHANGE UP (ref 11.5–15.5)
MAGNESIUM SERPL-MCNC: 1.8 MG/DL — SIGNIFICANT CHANGE UP (ref 1.6–2.6)
MCHC RBC-ENTMCNC: 28.6 PG — SIGNIFICANT CHANGE UP (ref 27–34)
MCHC RBC-ENTMCNC: 32 GM/DL — SIGNIFICANT CHANGE UP (ref 32–36)
MCV RBC AUTO: 89.6 FL — SIGNIFICANT CHANGE UP (ref 80–100)
NRBC # BLD: 0 /100 WBCS — SIGNIFICANT CHANGE UP (ref 0–0)
PHOSPHATE SERPL-MCNC: 4.1 MG/DL — SIGNIFICANT CHANGE UP (ref 2.5–4.5)
PLATELET # BLD AUTO: 166 K/UL — SIGNIFICANT CHANGE UP (ref 150–400)
POTASSIUM SERPL-MCNC: 4 MMOL/L — SIGNIFICANT CHANGE UP (ref 3.5–5.3)
POTASSIUM SERPL-SCNC: 4 MMOL/L — SIGNIFICANT CHANGE UP (ref 3.5–5.3)
PROT SERPL-MCNC: 6.7 G/DL — SIGNIFICANT CHANGE UP (ref 6–8.3)
RBC # BLD: 4.12 M/UL — SIGNIFICANT CHANGE UP (ref 3.8–5.2)
RBC # FLD: 13 % — SIGNIFICANT CHANGE UP (ref 10.3–14.5)
SARS-COV-2 RNA SPEC QL NAA+PROBE: SIGNIFICANT CHANGE UP
SODIUM SERPL-SCNC: 137 MMOL/L — SIGNIFICANT CHANGE UP (ref 135–145)
SURGICAL PATHOLOGY STUDY: SIGNIFICANT CHANGE UP
WBC # BLD: 8.33 K/UL — SIGNIFICANT CHANGE UP (ref 3.8–10.5)
WBC # FLD AUTO: 8.33 K/UL — SIGNIFICANT CHANGE UP (ref 3.8–10.5)

## 2022-01-11 PROCEDURE — 84484 ASSAY OF TROPONIN QUANT: CPT

## 2022-01-11 PROCEDURE — 85014 HEMATOCRIT: CPT

## 2022-01-11 PROCEDURE — 80048 BASIC METABOLIC PNL TOTAL CA: CPT

## 2022-01-11 PROCEDURE — 83735 ASSAY OF MAGNESIUM: CPT

## 2022-01-11 PROCEDURE — 86985 SPLIT BLOOD OR PRODUCTS: CPT

## 2022-01-11 PROCEDURE — 83605 ASSAY OF LACTIC ACID: CPT

## 2022-01-11 PROCEDURE — 86900 BLOOD TYPING SEROLOGIC ABO: CPT

## 2022-01-11 PROCEDURE — P9100: CPT

## 2022-01-11 PROCEDURE — 80053 COMPREHEN METABOLIC PANEL: CPT

## 2022-01-11 PROCEDURE — 85610 PROTHROMBIN TIME: CPT

## 2022-01-11 PROCEDURE — 71045 X-RAY EXAM CHEST 1 VIEW: CPT

## 2022-01-11 PROCEDURE — C1889: CPT

## 2022-01-11 PROCEDURE — 87040 BLOOD CULTURE FOR BACTERIA: CPT

## 2022-01-11 PROCEDURE — 80061 LIPID PANEL: CPT

## 2022-01-11 PROCEDURE — 36415 COLL VENOUS BLD VENIPUNCTURE: CPT

## 2022-01-11 PROCEDURE — 83036 HEMOGLOBIN GLYCOSYLATED A1C: CPT

## 2022-01-11 PROCEDURE — 85027 COMPLETE CBC AUTOMATED: CPT

## 2022-01-11 PROCEDURE — 86901 BLOOD TYPING SEROLOGIC RH(D): CPT

## 2022-01-11 PROCEDURE — 85018 HEMOGLOBIN: CPT

## 2022-01-11 PROCEDURE — P9040: CPT

## 2022-01-11 PROCEDURE — 84100 ASSAY OF PHOSPHORUS: CPT

## 2022-01-11 PROCEDURE — 99239 HOSP IP/OBS DSCHRG MGMT >30: CPT

## 2022-01-11 PROCEDURE — 82962 GLUCOSE BLOOD TEST: CPT

## 2022-01-11 PROCEDURE — 88312 SPECIAL STAINS GROUP 1: CPT

## 2022-01-11 PROCEDURE — P9037: CPT

## 2022-01-11 PROCEDURE — 84443 ASSAY THYROID STIM HORMONE: CPT

## 2022-01-11 PROCEDURE — 85025 COMPLETE CBC W/AUTO DIFF WBC: CPT

## 2022-01-11 PROCEDURE — P9011: CPT

## 2022-01-11 PROCEDURE — 88305 TISSUE EXAM BY PATHOLOGIST: CPT

## 2022-01-11 PROCEDURE — 36430 TRANSFUSION BLD/BLD COMPNT: CPT

## 2022-01-11 PROCEDURE — 85730 THROMBOPLASTIN TIME PARTIAL: CPT

## 2022-01-11 PROCEDURE — 82272 OCCULT BLD FECES 1-3 TESTS: CPT

## 2022-01-11 PROCEDURE — 96374 THER/PROPH/DIAG INJ IV PUSH: CPT

## 2022-01-11 PROCEDURE — 86850 RBC ANTIBODY SCREEN: CPT

## 2022-01-11 PROCEDURE — 99285 EMERGENCY DEPT VISIT HI MDM: CPT | Mod: 25

## 2022-01-11 PROCEDURE — 86923 COMPATIBILITY TEST ELECTRIC: CPT

## 2022-01-11 PROCEDURE — 93005 ELECTROCARDIOGRAM TRACING: CPT

## 2022-01-11 PROCEDURE — 87635 SARS-COV-2 COVID-19 AMP PRB: CPT

## 2022-01-11 PROCEDURE — 83690 ASSAY OF LIPASE: CPT

## 2022-01-11 RX ORDER — SENNA PLUS 8.6 MG/1
2 TABLET ORAL
Qty: 0 | Refills: 0 | DISCHARGE

## 2022-01-11 RX ORDER — DONEPEZIL HYDROCHLORIDE 10 MG/1
1 TABLET, FILM COATED ORAL
Qty: 0 | Refills: 0 | DISCHARGE

## 2022-01-11 RX ORDER — ROSUVASTATIN CALCIUM 5 MG/1
1 TABLET ORAL
Qty: 0 | Refills: 0 | DISCHARGE

## 2022-01-11 RX ORDER — GLIMEPIRIDE 1 MG
1 TABLET ORAL
Qty: 0 | Refills: 0 | DISCHARGE

## 2022-01-11 RX ORDER — ESCITALOPRAM OXALATE 10 MG/1
1 TABLET, FILM COATED ORAL
Qty: 0 | Refills: 0 | DISCHARGE

## 2022-01-11 RX ORDER — FUROSEMIDE 40 MG
0 TABLET ORAL
Qty: 0 | Refills: 0 | DISCHARGE

## 2022-01-11 RX ORDER — INSULIN GLARGINE 100 [IU]/ML
20 INJECTION, SOLUTION SUBCUTANEOUS
Qty: 0 | Refills: 0 | DISCHARGE

## 2022-01-11 RX ORDER — APIXABAN 2.5 MG/1
1 TABLET, FILM COATED ORAL
Qty: 0 | Refills: 0 | DISCHARGE

## 2022-01-11 RX ORDER — LEVOTHYROXINE SODIUM 125 MCG
1 TABLET ORAL
Qty: 0 | Refills: 0 | DISCHARGE

## 2022-01-11 RX ORDER — METOPROLOL TARTRATE 50 MG
1 TABLET ORAL
Qty: 0 | Refills: 0 | DISCHARGE

## 2022-01-11 RX ORDER — MEMANTINE HYDROCHLORIDE 10 MG/1
1 TABLET ORAL
Qty: 0 | Refills: 0 | DISCHARGE

## 2022-01-11 RX ORDER — PANTOPRAZOLE SODIUM 20 MG/1
1 TABLET, DELAYED RELEASE ORAL
Qty: 60 | Refills: 0
Start: 2022-01-11 | End: 2022-02-09

## 2022-01-11 RX ORDER — ASPIRIN/CALCIUM CARB/MAGNESIUM 324 MG
1 TABLET ORAL
Qty: 0 | Refills: 0 | DISCHARGE
Start: 2022-01-11

## 2022-01-11 RX ORDER — PANTOPRAZOLE SODIUM 20 MG/1
40 TABLET, DELAYED RELEASE ORAL EVERY 12 HOURS
Refills: 0 | Status: DISCONTINUED | OUTPATIENT
Start: 2022-01-11 | End: 2022-01-11

## 2022-01-11 RX ADMIN — PANTOPRAZOLE SODIUM 40 MILLIGRAM(S): 20 TABLET, DELAYED RELEASE ORAL at 06:00

## 2022-01-11 RX ADMIN — MEMANTINE HYDROCHLORIDE 10 MILLIGRAM(S): 10 TABLET ORAL at 13:51

## 2022-01-11 RX ADMIN — Medication 100 MICROGRAM(S): at 05:57

## 2022-01-11 RX ADMIN — Medication 2: at 08:39

## 2022-01-11 RX ADMIN — ESCITALOPRAM OXALATE 10 MILLIGRAM(S): 10 TABLET, FILM COATED ORAL at 13:51

## 2022-01-11 RX ADMIN — APIXABAN 2.5 MILLIGRAM(S): 2.5 TABLET, FILM COATED ORAL at 05:57

## 2022-01-11 NOTE — DISCHARGE NOTE PROVIDER - NSDCMRMEDTOKEN_GEN_ALL_CORE_FT
Aspirin Enteric Coated 81 mg oral delayed release tablet: 1 tab(s) orally once a day  donepezil 10 mg oral tablet: 1 tab(s) orally once a day (at bedtime)  Eliquis 2.5 mg oral tablet: 1 tab(s) orally 2 times a day  ergocalciferol 50,000 intl units oral capsule:  orally  QWEEKLY  escitalopram 10 mg oral tablet: 1 tab(s) orally once a day  furosemide: tab(s) orally once a week on Monday  levothyroxine 100 mcg (0.1 mg) oral tablet: 1 tab(s) orally once a day  metoprolol 50 mg oral tablet, extended release: 1 tab(s) orally once a day  Namenda XR 28 mg oral capsule, extended release: 1 cap(s) orally once a day  rosuvastatin 10 mg oral tablet: 1 tab(s) orally once a day   Amaryl 1 mg oral tablet: 1 tab(s) orally 2 times a day  Basaglar KwikPen 100 units/mL subcutaneous solution: 20 unit(s) subcutaneous once a day (at bedtime)  Crestor 20 mg oral tablet: 1 tab(s) orally once a day  donepezil 10 mg oral tablet: 1 tab(s) orally once a day (at bedtime)  Eliquis 2.5 mg oral tablet: 1 tab(s) orally 2 times a day  Lasix 40 mg oral tablet: orally once a week  Lexapro 20 mg oral tablet: 1 tab(s) orally once a day  memantine 28 mg oral capsule, extended release: 1 cap(s) orally once a day  pantoprazole 40 mg oral delayed release tablet: 1 tab(s) orally 2 times a day   Synthroid 100 mcg (0.1 mg) oral tablet: 1 tab(s) orally once a day   Amaryl 1 mg oral tablet: 1 tab(s) orally 2 times a day  Aspirin Enteric Coated 81 mg oral delayed release tablet: 1 tab(s) orally once a day  Basaglar KwikPen 100 units/mL subcutaneous solution: 20 unit(s) subcutaneous once a day (at bedtime)  Crestor 20 mg oral tablet: 1 tab(s) orally once a day  donepezil 10 mg oral tablet: 1 tab(s) orally once a day (at bedtime)  Eliquis 2.5 mg oral tablet: 1 tab(s) orally 2 times a day  Lasix 40 mg oral tablet: orally once a week  Lexapro 20 mg oral tablet: 1 tab(s) orally once a day  memantine 28 mg oral capsule, extended release: 1 cap(s) orally once a day  pantoprazole 40 mg oral delayed release tablet: 1 tab(s) orally 2 times a day   Synthroid 100 mcg (0.1 mg) oral tablet: 1 tab(s) orally once a day

## 2022-01-11 NOTE — DISCHARGE NOTE PROVIDER - NSDCPNSUBOBJ_GEN_ALL_CORE
#Gastric Ulcer  #Acute Blood Loss Anemia  #Paroxysmal Atrial Fibrillation  #Complete Heart Block s/p pacemaker  #Dementia    Hemoglobin stable over the last 4 days after EGD. Dark stools likely represent clearing of old blood, and not new blood as patient remains hemodynamically stable with stable blood counts. Patient A&Ox1-2 at baseline. Discharge home today with daughter. Continue on PPI BID. Follow-up with GI as an outpatient. #Gastric Ulcer  #Acute Blood Loss Anemia  #Paroxysmal Atrial Fibrillation  #Permanent PaceMaker  #Dementia    Hemoglobin stable over the last 4 days after EGD. Dark stools likely represent clearing of old blood, and not new blood as patient remains hemodynamically stable with stable blood counts. Patient A&Ox1-2 at baseline. Discharge home today with daughter. Continue on PPI BID. Follow-up with GI as an outpatient. #Gastric Ulcer  #Hemorrhagic Shock  #Acute Blood Loss Anemia  #Paroxysmal Atrial Fibrillation  #Permanent PaceMaker  #Dementia    Hemoglobin stable over the last 4 days after EGD. Dark stools likely represent clearing of old blood, and not new blood as patient remains hemodynamically stable with stable blood counts. Patient A&Ox1-2 at baseline. Discharge home today with daughter. Continue on PPI BID. Follow-up with GI as an outpatient.

## 2022-01-11 NOTE — PROGRESS NOTE ADULT - PROBLEM SELECTOR PLAN 3
- h/o afib, on metoprolol and Eliquis  - hold both meds  - telemonitoring  - interrogated pacemaker today w/no events
- h/o afib, on metoprolol and Eliquis  - hold both meds  - telemonitoring  - interrogated pacemaker today
- h/o afib, on metoprolol and Eliquis  - hold both meds  - telemonitoring  - interrogated pacemaker today w/no events
- h/o afib, on metoprolol and Eliquis  - hold both meds  - telemonitoring  - interrogated pacemaker today w/no events  - upon dc can hold metoprolol due to low blood pressures

## 2022-01-11 NOTE — PROGRESS NOTE ADULT - PROVIDER SPECIALTY LIST ADULT
Internal Medicine
Gastroenterology
Gastroenterology
Internal Medicine

## 2022-01-11 NOTE — DISCHARGE NOTE PROVIDER - CARE PROVIDER_API CALL
Del Dalton)  Gastroenterology; Internal Medicine  [address] [facility] [address] [place] [address]  [place] [address]  Phone: [phone]  Fax: [phone]  Follow Up Time:

## 2022-01-11 NOTE — PROGRESS NOTE ADULT - PROBLEM SELECTOR PLAN 4
- h/o diabetes, on insulin and oral anti-glycemic meds  - c/w ISS  - FS qACHs  - a1c 7.3

## 2022-01-11 NOTE — DISCHARGE NOTE PROVIDER - NSDCCPCAREPLAN_GEN_ALL_CORE_FT
PRINCIPAL DISCHARGE DIAGNOSIS  Diagnosis: GI bleed  Assessment and Plan of Treatment: You presented to the ED with a gastrointestinal bleed. You were started on fluids and Hgb remained stable. You were seen by GI and they performed and EGD or upper endoscopy. This showed that there was a large gastric antral ulcer with vissible vessel and contact bleeding. Bleeding was stopped with clip. Patient started on clear liquid diet and it was tolerated well. Patient started on oral medications to protect against the stomach. Patient's Eliquis resumed and you did not develop any signs of bleeding. Patient is to CONTINUE ELIQUIS and please RETURN TO THE HOSPITAL IF GI BLEEDING RESUMES. PLEASE TAKE PANTOPRAZOLE 40MG ONCE A DAY. PLEASE FOLLOW WITH GI DOCTOR DR. GAN FOR SURGICAL PATHOLOGIES.      SECONDARY DISCHARGE DIAGNOSES  Diagnosis: Afib  Assessment and Plan of Treatment: You have a history of abnormal rhtyhm called Atrial Fibrillation. You were noted to be on Eliquis. Your Eliquis was held because you had developed a Gastrointestinal bleed. PLEASE CONTINUE WITH YOUR HOME ELIQUIS DOSE. Please follow with your primary care provider.    Diagnosis: DM (diabetes mellitus)  Assessment and Plan of Treatment: You have a history of Diabetes, your Hemoglobin A1C was 7.3, which is concerning for diabetes. PLEASE CONTINUE TO CHECK YOUR GLUCOSE LEVELS EVERYDAY. PLEASE CONSUME A HEALTHY DIET. PLEASE FOLLOW WITH YOUR PRIMARY CARE DOCTOR.    Diagnosis: Dementia  Assessment and Plan of Treatment: You have a history of Dementia. Please CONTINUE YOUR HOME MEDICATIONS     PRINCIPAL DISCHARGE DIAGNOSIS  Diagnosis: GI bleed  Assessment and Plan of Treatment: You presented to the ED with a gastrointestinal bleed. You were started on fluids and Hgb remained stable. You were seen by GI and they performed and EGD or upper endoscopy. This showed that there was a large gastric antral ulcer with vissible vessel and contact bleeding. Bleeding was stopped with clip. Patient started on clear liquid diet and it was tolerated well. Patient started on oral medications to protect against the stomach. Patient's Eliquis resumed and you did not develop any signs of bleeding. Patient is to CONTINUE ELIQUIS and please RETURN TO THE HOSPITAL IF GI BLEEDING RESUMES. PLEASE TAKE PANTOPRAZOLE 40MG ONCE A DAY. PLEASE FOLLOW WITH GI DOCTOR DR. GAN FOR SURGICAL PATHOLOGIES. PLEASE TAKE PANTOPRAZOLE 40MG ONE TABLET TWO TIMES A DAY.      SECONDARY DISCHARGE DIAGNOSES  Diagnosis: Afib  Assessment and Plan of Treatment: You have a history of abnormal rhtyhm called Atrial Fibrillation. You were noted to be on Eliquis. Your Eliquis was held because you had developed a Gastrointestinal bleed. PLEASE CONTINUE WITH YOUR HOME ELIQUIS DOSE. Please follow with your primary care provider. PLEASE STOP METOPROLOL 50MG.    Diagnosis: DM (diabetes mellitus)  Assessment and Plan of Treatment: You have a history of Diabetes, your Hemoglobin A1C was 7.3, which is concerning for diabetes. PLEASE CONTINUE TO CHECK YOUR GLUCOSE LEVELS EVERYDAY. PLEASE CONSUME A HEALTHY DIET. PLEASE FOLLOW WITH YOUR PRIMARY CARE DOCTOR. PLEASE CONTINUE YOUR HOME INSULIN MEDICATION. PLEASE FOLLOW WITH YOUR PRIMARY CARE PROVIDER.    Diagnosis: HTN (hypertension)  Assessment and Plan of Treatment: You have high blood pressure. PLEASE STOP TAKING METOPROLOL 50MG ONE TABLET, because YOUR HEART WAS SLOW AND BLOOD PRESSURE WAS NORMAL. PLEASE CONTINUE LASIX YOUR HOME MEDICATION. PLEASE FOLLOW WITH YOUR PRIMARY CARE PROVIDER.    Diagnosis: Dementia  Assessment and Plan of Treatment: You have a history of Dementia. Please CONTINUE YOUR HOME MEDICATIONS. Please continue, your home medications.     PRINCIPAL DISCHARGE DIAGNOSIS  Diagnosis: GI bleed  Assessment and Plan of Treatment: You presented to the ED with a gastrointestinal bleed. You were started on fluids and Hgb remained stable. You were seen by GI and they performed and EGD or upper endoscopy. This showed that there was a large gastric antral ulcer with vissible vessel and contact bleeding. Bleeding was stopped with clip. Patient started on clear liquid diet and it was tolerated well. Patient started on oral medications to protect against the stomach. Patient's Eliquis resumed and you did not develop any signs of bleeding. Patient is to CONTINUE ELIQUIS and please RETURN TO THE HOSPITAL IF GI BLEEDING RESUMES. PLEASE TAKE PANTOPRAZOLE 40MG ONCE A DAY. PLEASE FOLLOW WITH GI DOCTOR DR. GAN FOR SURGICAL PATHOLOGIES. PLEASE TAKE PANTOPRAZOLE 40MG ONE TABLET TWO TIMES A DAY.      SECONDARY DISCHARGE DIAGNOSES  Diagnosis: Afib  Assessment and Plan of Treatment: You have a history of abnormal rhtyhm called Atrial Fibrillation. You were noted to be on Eliquis. Your Eliquis was held because you had developed a Gastrointestinal bleed. PLEASE CONTINUE WITH YOUR HOME ELIQUIS DOSE. Please follow with your primary care provider.    Diagnosis: DM (diabetes mellitus)  Assessment and Plan of Treatment: You have a history of Diabetes, your Hemoglobin A1C was 7.3, which is concerning for diabetes. PLEASE CONTINUE TO CHECK YOUR GLUCOSE LEVELS EVERYDAY. PLEASE CONSUME A HEALTHY DIET. PLEASE FOLLOW WITH YOUR PRIMARY CARE DOCTOR. PLEASE CONTINUE YOUR HOME INSULIN MEDICATION. PLEASE FOLLOW WITH YOUR PRIMARY CARE PROVIDER.    Diagnosis: Dementia  Assessment and Plan of Treatment: You have a history of Dementia. Please CONTINUE YOUR HOME MEDICATIONS. Please continue, your home medications.    Diagnosis: HTN (hypertension)  Assessment and Plan of Treatment: You have high blood pressure. Continue taking metoprolol 50mg daily.

## 2022-01-11 NOTE — PROGRESS NOTE ADULT - ASSESSMENT
- EGD was performed on 1/7/21: Upper GI bleed secondary to large gastric antral ulcer with visible vessel and contact bleeding. Clip was placed to complete hemostasis.  - continue current diet   - continue PPI p.o. daily   - continue CBC    
86yoF, w/ AAOx1-2 and ambulates w/ assistance only at baseline, w/ PMH of dementia, DM, HTN, afib (on Eliquis), permanent pacemaker, p/w low systolic blood pressure of low 70's. Admitted for Upper GI bleed on eliquis.    #UGIB  #Afib on eliquis  #IZAIAH  #pacemaker  #Dementia  #DM, HTN  - EGD Clear liquid diet today, advance as tolerated - Upper GI bleed secondary to large gastric antral ulcer with visible vessel and contact bleeding. Complete hemostasis achieved with one 11 mm over-the-scope clip.   - Follow-up pathology results.   - Monitor for any signs of recurring overt GI bleed  -  Continue PPI IV BID - Transition to PO tomorrow  - Hgb stable   - Will decide on AC pending EGD decision  - Holding metoprolol and eliquis - Will plan to start once cleared by GI  - SSI  - f/u GI    
86yoF, w/ AAOx1-2 and ambulates w/ assistance only at baseline, w/ PMH of dementia, DM, HTN, afib (on Eliquis), permanent pacemaker, p/w low systolic blood pressure of low 70's. Admitted for Upper GI bleed on eliquis.    #UGIB  #Afib on eliquis  #IZAIAH  #pacemaker  #Dementia  #DM, HTN  - Hgb stable. EGD today  - Will decide on AC pending EGD decision  - Holding metoprolol and eliquis in setting of GI bleed  - SSI  - f/u GI
86yoF, w/ AAOx1-2 and ambulates w/ assistance only at baseline, w/ PMH of dementia, DM, HTN, afib (on Eliquis), permanent pacemaker, p/w low systolic blood pressure of low 70's. Admitted for Upper GI bleed on eliquis.    #UGIB  #Afib on eliquis  #IZAIAH  #pacemaker  #Dementia  #DM, HTN  EGD: Upper GI bleed secondary to large gastric antral ulcer with visible vessel and contact bleeding. Complete hemostasis achieved with one 11 mm over-the-scope clip.   - Full liquid diet today - Advance tomorrow  - Follow-up pathology results.   - Monitor for any signs of recurring overt GI bleed  - Pantoprazole PO  - Hgb stable   - Start eliquis 2.5mg BID today  - SSI  - f/u GI
86F presenting with coffee ground emesis and melena initial Hb 9 now Hb stable with appropriate response. Discussion had with patient's daughter who wishes to pursue EGD despite now stable Hb to identify any lesions high risk to rebleed blossom in light of need for resuming AC.   - Plan for EGD tomorrow.  - NPO after midnight.   - Continue PPI BID.  - Obtain two large bore IVs. 
Patient is a 86yoF, w/ AAOx1-2 and ambulates w/ assistance only at baseline, w/ PMH of dementia, DM, HTN, afib (on Eliquis), permanent pacemaker, p/w low systolic blood pressure of low 70's. Admitted for GI bleed.

## 2022-01-11 NOTE — PROGRESS NOTE ADULT - PROBLEM SELECTOR PLAN 1
- FOBT pos  hb stable and WNL   s/p NS 2L bolus, 2u pRBC, kcentra, plasma, and 1u platelet in ED  - monitor CBC q6h  - NPO after midnight  - EGD showing large antral ulcer with visible vessel and contact bleeding hemostasis achieved iwht clip  - advance diet as tolerated, on regular   - GI, Dr. Dalton, onboard  PT consulted - rec home PT  - dc to home if no bleeding  - pantoprazole 40mg bid upon discharge

## 2022-01-11 NOTE — PROGRESS NOTE ADULT - PROBLEM SELECTOR PLAN 2
- p/w Cr 1.52, currently 1.38   - likely prerenal  - s/p fluid resuscitation
- p/w Cr 1.52  - likely prerenal  - s/p fluid resuscitation  RESOLVED
- p/w Cr 1.52, currently 1.38   - likely prerenal  - s/p fluid resuscitation
- p/w Cr 1.52  - likely prerenal  - s/p fluid resuscitation  RESOLVED

## 2022-01-11 NOTE — PROGRESS NOTE ADULT - PROBLEM SELECTOR PROBLEM 2
IZAIAH (acute kidney injury)

## 2022-01-11 NOTE — DISCHARGE NOTE NURSING/CASE MANAGEMENT/SOCIAL WORK - PATIENT PORTAL LINK FT
You can access the FollowMyHealth Patient Portal offered by [facility] by registering at the following website: [email or web address]. By joining [facility]’s FollowMyHealth portal, you will also be able to view your health information using other applications (apps) compatible with our system.

## 2022-01-11 NOTE — PROGRESS NOTE ADULT - PROBLEM SELECTOR PLAN 5
- h/o HTN, on metoprolol
- h/o HTN, on metoprolol
- h/o HTN, on metoprolol  - currently normotensive  - on d/c can hold metoprolol
- h/o HTN, on metoprolol

## 2022-01-11 NOTE — PROGRESS NOTE ADULT - SUBJECTIVE AND OBJECTIVE BOX
PGY1 Progress Note discussed with attending     PAGER #: [[phone]] TILL 5:00 PM  PLEASE CONTACT ON CALL TEAM:  - On Call Team (Please refer to Peter) FROM 5:00 PM - 8:30PM  - Nightfloat Team FROM 8:30 -7:30 AM    CHIEF COMPLAINT & BRIEF HOSPITAL COURSE:    INTERVAL HPI/OVERNIGHT EVENTS:       REVIEW OF SYSTEMS:  CONSTITUTIONAL: No fever, weight loss, or fatigue  RESPIRATORY: No cough, wheezing, chills or hemoptysis; No shortness of breath  CARDIOVASCULAR: No chest pain, palpitations, dizziness, or leg swelling  GASTROINTESTINAL: No abdominal pain. No nausea, vomiting, or hematemesis; No diarrhea or constipation. No melena or hematochezia.  GENITOURINARY: No dysuria or hematuria, urinary frequency  NEUROLOGICAL: No headaches, memory loss, loss of strength, numbness, or tremors  SKIN: No itching, burning, rashes, or lesions     Vital Signs Last 24 Hrs  T(C): 36.3 (11 Jan 2022 07:31), Max: 37 (10 Balbir 2022 11:03)  T(F): 97.4 (11 Jan 2022 07:31), Max: 98.6 (10 Balbir 2022 11:03)  HR: 55 (11 Jan 2022 07:31) (55 - 65)  BP: 130/74 (11 Jan 2022 07:31) (101/43 - 136/58)  BP(mean): --  RR: 18 (11 Jan 2022 07:31) (18 - 19)  SpO2: 95% (11 Jan 2022 07:31) (95% - 99%)    PHYSICAL EXAMINATION:  GENERAL: NAD, well built  HEAD:  Atraumatic, Normocephalic  EYES:  conjunctiva and sclera clear  NECK: Supple, No JVD, Normal thyroid  CHEST/LUNG: Clear to auscultation. Clear to percussion bilaterally; No rales, rhonchi, wheezing, or rubs  HEART: Regular rate and rhythm; No murmurs, rubs, or gallops  ABDOMEN: Soft, Nontender, Nondistended; Bowel sounds present  NERVOUS SYSTEM:  Alert & Oriented X3,    EXTREMITIES:  2+ Peripheral Pulses, No clubbing, cyanosis, or edema  SKIN: warm dry                          11.8   8.33  )-----------( 166      ( 11 Jan 2022 08:12 )             36.9     01-11    137  |  100  |  19<H>  ----------------------------<  211<H>  4.0   |  30  |  1.26    Ca    9.1      11 Jan 2022 08:12  Phos  4.1     01-11  Mg     1.8     01-11    TPro  6.7  /  Alb  2.9<L>  /  TBili  1.1  /  DBili  x   /  AST  14  /  ALT  15  /  AlkPhos  56  01-11    LIVER FUNCTIONS - ( 11 Jan 2022 08:12 )  Alb: 2.9 g/dL / Pro: 6.7 g/dL / ALK PHOS: 56 U/L / ALT: 15 U/L DA / AST: 14 U/L / GGT: x                   CAPILLARY BLOOD GLUCOSE      RADIOLOGY & ADDITIONAL TESTS:                   PGY1 Progress Note discussed with attending     PAGER #: [[phone]] TILL 5:00 PM  PLEASE CONTACT ON CALL TEAM:  - On Call Team (Please refer to Peter) FROM 5:00 PM - 8:30PM  - Nightfloat Team FROM 8:30 -7:30 AM    CHIEF COMPLAINT & BRIEF HOSPITAL COURSE:    INTERVAL HPI/OVERNIGHT EVENTS: No acute events noted overnight. Patient denies fevers or chills.       REVIEW OF SYSTEMS:  CONSTITUTIONAL: No fever, weight loss, or fatigue  RESPIRATORY: No cough, wheezing, chills or hemoptysis; No shortness of breath  CARDIOVASCULAR: No chest pain, palpitations, dizziness, or leg swelling  GASTROINTESTINAL: No abdominal pain. No nausea, vomiting, or hematemesis; No diarrhea or constipation. No melena or hematochezia.  GENITOURINARY: No dysuria or hematuria, urinary frequency  NEUROLOGICAL: No headaches, memory loss, loss of strength, numbness, or tremors  SKIN: No itching, burning, rashes, or lesions     Vital Signs Last 24 Hrs  T(C): 36.3 (11 Jan 2022 07:31), Max: 37 (10 Balbir 2022 11:03)  T(F): 97.4 (11 Jan 2022 07:31), Max: 98.6 (10 Balbir 2022 11:03)  HR: 55 (11 Jan 2022 07:31) (55 - 65)  BP: 130/74 (11 Jan 2022 07:31) (101/43 - 136/58)  BP(mean): --  RR: 18 (11 Jan 2022 07:31) (18 - 19)  SpO2: 95% (11 Jan 2022 07:31) (95% - 99%)    PHYSICAL EXAMINATION:  GENERAL: NAD, well built  HEAD:  Atraumatic, Normocephalic  EYES:  conjunctiva and sclera clear  NECK: Supple, No JVD, Normal thyroid  CHEST/LUNG: Clear to auscultation. No rales, rhonchi, wheezing, or rubs  HEART: Regular rate and rhythm; No murmurs, rubs, or gallops  ABDOMEN: Soft, Nontender, Nondistended; Bowel sounds present  NERVOUS SYSTEM:  Alert & Oriented X3,    EXTREMITIES:  2+ Peripheral Pulses, No clubbing, cyanosis, or edema  SKIN: warm dry                          11.8   8.33  )-----------( 166      ( 11 Jan 2022 08:12 )             36.9     01-11    137  |  100  |  19<H>  ----------------------------<  211<H>  4.0   |  30  |  1.26    Ca    9.1      11 Jan 2022 08:12  Phos  4.1     01-11  Mg     1.8     01-11    TPro  6.7  /  Alb  2.9<L>  /  TBili  1.1  /  DBili  x   /  AST  14  /  ALT  15  /  AlkPhos  56  01-11    LIVER FUNCTIONS - ( 11 Jan 2022 08:12 )  Alb: 2.9 g/dL / Pro: 6.7 g/dL / ALK PHOS: 56 U/L / ALT: 15 U/L DA / AST: 14 U/L / GGT: x                   CAPILLARY BLOOD GLUCOSE      RADIOLOGY & ADDITIONAL TESTS:

## 2022-01-11 NOTE — DISCHARGE NOTE NURSING/CASE MANAGEMENT/SOCIAL WORK - NSDCPEFALRISK_GEN_ALL_CORE
For information on Fall & Injury Prevention, visit: [email or web address] OR  [email or web address] OR  https://www.cdc.gov/steadi/patient.html

## 2022-01-11 NOTE — PROGRESS NOTE ADULT - PROBLEM SELECTOR PLAN 6
- h/o maria ines  - c/w home meds

## 2022-01-11 NOTE — PHYSICAL THERAPY INITIAL EVALUATION ADULT - ADDITIONAL COMMENTS
previously able to ambulate independently at home prior to COVID-19 infection in 2021; recently assisted by [facility] to perform out of bed to chair/commode transfers. Has wheelchair at home for mobility

## 2022-01-11 NOTE — DISCHARGE NOTE PROVIDER - ATTENDING DISCHARGE PHYSICAL EXAMINATION:
GENERAL: NAD, well-developed  HEAD:  Atraumatic, Normocephalic  EYES: EOMI, PERRLA, conjunctiva and sclera clear  NECK: Supple, No JVD  CHEST/LUNG: Clear to auscultation bilaterally; No wheeze  HEART: Regular rate and rhythm; No murmurs, rubs, or gallops  ABDOMEN: Soft, Nontender, Nondistended; Bowel sounds present, umbilical hernia reducible  EXTREMITIES:  2+ Peripheral Pulses, No clubbing, cyanosis, or edema  PSYCH: AAOx1  NEUROLOGY: non-focal  SKIN: No rashes or lesions

## 2022-01-11 NOTE — PHYSICAL THERAPY INITIAL EVALUATION ADULT - GENERAL OBSERVATIONS, REHAB EVAL
awake, alert, confused, not oriented to self, time, place, or situation. + peripheral IV access on left forearm

## 2022-01-11 NOTE — DISCHARGE NOTE PROVIDER - HOSPITAL COURSE
Patient is a 86yoF, w/ AAOx1-2 and ambulates w/ assistance only at baseline, w/ PMH of dementia, DM, HTN, afib (on Eliquis), permanent pacemaker, p/w low systolic blood pressure of low 70's. Admitted for GI bleed, while bleeding on Eliquis.     For GI bleed: Patient presented with hypotension, given fluids, PPI IV BID started. Hgb noted to be 9.3.  GI Consulted and performed an EGD, EGD showing Upper GI bleed secondary to large gastric antral ulcer with visible vessel and contact bleeding. Complete hemostasis achieved with one 11 mm over-the-scope clip. Patient transitioned to clear liquid diets and PPI continued. Eliquis started on 1/9 patient did not develop bleeding. Patient will need to follow up for pathology results and will be discharged on PO PPI.    DM, HTN and Dementia: Patient managed with home medications    IZAIAH: Patient noted to have elevated renal function. Improved with IVF. Patient's renal function remained stable.    Patient is stable for discharge. Patient is a 86yoF, w/ AAOx1-2 and ambulates w/ assistance only at baseline, w/ PMH of dementia, DM, HTN, afib (on Eliquis), permanent pacemaker, p/w low systolic blood pressure of low 70's. Admitted for GI bleed, while bleeding on Eliquis.     For GI bleed: Patient presented with hypotension, given fluids, PPI IV BID started. Hgb noted to be 9.3.  GI Consulted and performed an EGD, EGD showing Upper GI bleed secondary to large gastric antral ulcer with visible vessel and contact bleeding. Complete hemostasis achieved with one 11 mm over-the-scope clip. Patient transitioned to clear liquid diets and PPI continued. Eliquis started on 1/9 patient did not develop bleeding. Patient will need to follow up for pathology results and will be discharged on PO PPI.    HTN: METOPROLOL 50MG WAS HELD, PATIENT NOTED TO BE BRADYCARDIC.    DM, and Dementia: Patient managed with home medications    IZAIAH: Patient noted to have elevated renal function. Improved with IVF. Patient's renal function remained stable.    Patient is stable for discharge. Patient is a 86yoF, w/ AAOx1-2 and ambulates w/ assistance only at baseline, w/ PMH of dementia, DM, HTN, afib (on Eliquis), permanent pacemaker, p/w low systolic blood pressure of low 70's. Admitted for GI bleed, while bleeding on Eliquis.     For GI bleed: Patient presented with hypotension, given fluids, PPI IV BID started. Hgb noted to be 9.3.  GI Consulted and performed an EGD, EGD showing Upper GI bleed secondary to large gastric antral ulcer with visible vessel and contact bleeding. Complete hemostasis achieved with one 11 mm over-the-scope clip. Patient transitioned to clear liquid diets and PPI continued. Eliquis started on 1/9 patient did not develop bleeding. Patient will need to follow up for pathology results and will be discharged on PO PPI.    HTN: Metoprolol held due to low blood pressure, to be resumed as patient permanently paced.    DM, and Dementia: Patient managed with home medications    IZAIAH: Patient noted to have elevated renal function. Improved with IVF. Patient's renal function remained stable.    Patient is stable for discharge.

## 2023-10-28 ENCOUNTER — EMERGENCY (EMERGENCY)
Facility: HOSPITAL | Age: 88
LOS: 1 days | Discharge: HOME | End: 2023-10-28
Attending: EMERGENCY MEDICINE
Payer: MEDICARE

## 2023-10-28 VITALS
HEART RATE: 60 BPM | RESPIRATION RATE: 14 BRPM | TEMPERATURE: 98 F | WEIGHT: 149.91 LBS | DIASTOLIC BLOOD PRESSURE: 50 MMHG | OXYGEN SATURATION: 98 % | SYSTOLIC BLOOD PRESSURE: 119 MMHG

## 2023-10-28 DIAGNOSIS — Z95.0 PRESENCE OF CARDIAC PACEMAKER: Chronic | ICD-10-CM

## 2023-10-28 LAB
ALBUMIN SERPL ELPH-MCNC: 3.3 G/DL — LOW (ref 3.5–5)
ALBUMIN SERPL ELPH-MCNC: 3.3 G/DL — LOW (ref 3.5–5)
ALP SERPL-CCNC: 59 U/L — SIGNIFICANT CHANGE UP (ref 40–120)
ALP SERPL-CCNC: 59 U/L — SIGNIFICANT CHANGE UP (ref 40–120)
ALT FLD-CCNC: 22 U/L DA — SIGNIFICANT CHANGE UP (ref 10–60)
ALT FLD-CCNC: 22 U/L DA — SIGNIFICANT CHANGE UP (ref 10–60)
ANION GAP SERPL CALC-SCNC: 4 MMOL/L — LOW (ref 5–17)
ANION GAP SERPL CALC-SCNC: 4 MMOL/L — LOW (ref 5–17)
APPEARANCE UR: ABNORMAL
APPEARANCE UR: ABNORMAL
AST SERPL-CCNC: 23 U/L — SIGNIFICANT CHANGE UP (ref 10–40)
AST SERPL-CCNC: 23 U/L — SIGNIFICANT CHANGE UP (ref 10–40)
BASOPHILS # BLD AUTO: 0.05 K/UL — SIGNIFICANT CHANGE UP (ref 0–0.2)
BASOPHILS # BLD AUTO: 0.05 K/UL — SIGNIFICANT CHANGE UP (ref 0–0.2)
BASOPHILS NFR BLD AUTO: 0.7 % — SIGNIFICANT CHANGE UP (ref 0–2)
BASOPHILS NFR BLD AUTO: 0.7 % — SIGNIFICANT CHANGE UP (ref 0–2)
BILIRUB SERPL-MCNC: 0.5 MG/DL — SIGNIFICANT CHANGE UP (ref 0.2–1.2)
BILIRUB SERPL-MCNC: 0.5 MG/DL — SIGNIFICANT CHANGE UP (ref 0.2–1.2)
BILIRUB UR-MCNC: NEGATIVE — SIGNIFICANT CHANGE UP
BILIRUB UR-MCNC: NEGATIVE — SIGNIFICANT CHANGE UP
BUN SERPL-MCNC: 23 MG/DL — HIGH (ref 7–18)
BUN SERPL-MCNC: 23 MG/DL — HIGH (ref 7–18)
CALCIUM SERPL-MCNC: 9.4 MG/DL — SIGNIFICANT CHANGE UP (ref 8.4–10.5)
CALCIUM SERPL-MCNC: 9.4 MG/DL — SIGNIFICANT CHANGE UP (ref 8.4–10.5)
CHLORIDE SERPL-SCNC: 105 MMOL/L — SIGNIFICANT CHANGE UP (ref 96–108)
CHLORIDE SERPL-SCNC: 105 MMOL/L — SIGNIFICANT CHANGE UP (ref 96–108)
CO2 SERPL-SCNC: 29 MMOL/L — SIGNIFICANT CHANGE UP (ref 22–31)
CO2 SERPL-SCNC: 29 MMOL/L — SIGNIFICANT CHANGE UP (ref 22–31)
COLOR SPEC: YELLOW — SIGNIFICANT CHANGE UP
COLOR SPEC: YELLOW — SIGNIFICANT CHANGE UP
CREAT SERPL-MCNC: 1.24 MG/DL — SIGNIFICANT CHANGE UP (ref 0.5–1.3)
CREAT SERPL-MCNC: 1.24 MG/DL — SIGNIFICANT CHANGE UP (ref 0.5–1.3)
DIFF PNL FLD: ABNORMAL
DIFF PNL FLD: ABNORMAL
EGFR: 42 ML/MIN/1.73M2 — LOW
EGFR: 42 ML/MIN/1.73M2 — LOW
EOSINOPHIL # BLD AUTO: 0.2 K/UL — SIGNIFICANT CHANGE UP (ref 0–0.5)
EOSINOPHIL # BLD AUTO: 0.2 K/UL — SIGNIFICANT CHANGE UP (ref 0–0.5)
EOSINOPHIL NFR BLD AUTO: 3 % — SIGNIFICANT CHANGE UP (ref 0–6)
EOSINOPHIL NFR BLD AUTO: 3 % — SIGNIFICANT CHANGE UP (ref 0–6)
GLUCOSE SERPL-MCNC: 114 MG/DL — HIGH (ref 70–99)
GLUCOSE SERPL-MCNC: 114 MG/DL — HIGH (ref 70–99)
GLUCOSE UR QL: NEGATIVE MG/DL — SIGNIFICANT CHANGE UP
GLUCOSE UR QL: NEGATIVE MG/DL — SIGNIFICANT CHANGE UP
HCT VFR BLD CALC: 39.4 % — SIGNIFICANT CHANGE UP (ref 34.5–45)
HCT VFR BLD CALC: 39.4 % — SIGNIFICANT CHANGE UP (ref 34.5–45)
HGB BLD-MCNC: 12.8 G/DL — SIGNIFICANT CHANGE UP (ref 11.5–15.5)
HGB BLD-MCNC: 12.8 G/DL — SIGNIFICANT CHANGE UP (ref 11.5–15.5)
IMM GRANULOCYTES NFR BLD AUTO: 0.3 % — SIGNIFICANT CHANGE UP (ref 0–0.9)
IMM GRANULOCYTES NFR BLD AUTO: 0.3 % — SIGNIFICANT CHANGE UP (ref 0–0.9)
KETONES UR-MCNC: NEGATIVE MG/DL — SIGNIFICANT CHANGE UP
KETONES UR-MCNC: NEGATIVE MG/DL — SIGNIFICANT CHANGE UP
LEUKOCYTE ESTERASE UR-ACNC: ABNORMAL
LEUKOCYTE ESTERASE UR-ACNC: ABNORMAL
LYMPHOCYTES # BLD AUTO: 2.43 K/UL — SIGNIFICANT CHANGE UP (ref 1–3.3)
LYMPHOCYTES # BLD AUTO: 2.43 K/UL — SIGNIFICANT CHANGE UP (ref 1–3.3)
LYMPHOCYTES # BLD AUTO: 35.9 % — SIGNIFICANT CHANGE UP (ref 13–44)
LYMPHOCYTES # BLD AUTO: 35.9 % — SIGNIFICANT CHANGE UP (ref 13–44)
MCHC RBC-ENTMCNC: 28.6 PG — SIGNIFICANT CHANGE UP (ref 27–34)
MCHC RBC-ENTMCNC: 28.6 PG — SIGNIFICANT CHANGE UP (ref 27–34)
MCHC RBC-ENTMCNC: 32.5 GM/DL — SIGNIFICANT CHANGE UP (ref 32–36)
MCHC RBC-ENTMCNC: 32.5 GM/DL — SIGNIFICANT CHANGE UP (ref 32–36)
MCV RBC AUTO: 87.9 FL — SIGNIFICANT CHANGE UP (ref 80–100)
MCV RBC AUTO: 87.9 FL — SIGNIFICANT CHANGE UP (ref 80–100)
MONOCYTES # BLD AUTO: 0.48 K/UL — SIGNIFICANT CHANGE UP (ref 0–0.9)
MONOCYTES # BLD AUTO: 0.48 K/UL — SIGNIFICANT CHANGE UP (ref 0–0.9)
MONOCYTES NFR BLD AUTO: 7.1 % — SIGNIFICANT CHANGE UP (ref 2–14)
MONOCYTES NFR BLD AUTO: 7.1 % — SIGNIFICANT CHANGE UP (ref 2–14)
NEUTROPHILS # BLD AUTO: 3.58 K/UL — SIGNIFICANT CHANGE UP (ref 1.8–7.4)
NEUTROPHILS # BLD AUTO: 3.58 K/UL — SIGNIFICANT CHANGE UP (ref 1.8–7.4)
NEUTROPHILS NFR BLD AUTO: 53 % — SIGNIFICANT CHANGE UP (ref 43–77)
NEUTROPHILS NFR BLD AUTO: 53 % — SIGNIFICANT CHANGE UP (ref 43–77)
NITRITE UR-MCNC: POSITIVE
NITRITE UR-MCNC: POSITIVE
NRBC # BLD: 0 /100 WBCS — SIGNIFICANT CHANGE UP (ref 0–0)
NRBC # BLD: 0 /100 WBCS — SIGNIFICANT CHANGE UP (ref 0–0)
PH UR: 7 — SIGNIFICANT CHANGE UP (ref 5–8)
PH UR: 7 — SIGNIFICANT CHANGE UP (ref 5–8)
PLATELET # BLD AUTO: 206 K/UL — SIGNIFICANT CHANGE UP (ref 150–400)
PLATELET # BLD AUTO: 206 K/UL — SIGNIFICANT CHANGE UP (ref 150–400)
POTASSIUM SERPL-MCNC: 5.1 MMOL/L — SIGNIFICANT CHANGE UP (ref 3.5–5.3)
POTASSIUM SERPL-MCNC: 5.1 MMOL/L — SIGNIFICANT CHANGE UP (ref 3.5–5.3)
POTASSIUM SERPL-SCNC: 5.1 MMOL/L — SIGNIFICANT CHANGE UP (ref 3.5–5.3)
POTASSIUM SERPL-SCNC: 5.1 MMOL/L — SIGNIFICANT CHANGE UP (ref 3.5–5.3)
PROT SERPL-MCNC: 7.4 G/DL — SIGNIFICANT CHANGE UP (ref 6–8.3)
PROT SERPL-MCNC: 7.4 G/DL — SIGNIFICANT CHANGE UP (ref 6–8.3)
PROT UR-MCNC: NEGATIVE MG/DL — SIGNIFICANT CHANGE UP
PROT UR-MCNC: NEGATIVE MG/DL — SIGNIFICANT CHANGE UP
RBC # BLD: 4.48 M/UL — SIGNIFICANT CHANGE UP (ref 3.8–5.2)
RBC # BLD: 4.48 M/UL — SIGNIFICANT CHANGE UP (ref 3.8–5.2)
RBC # FLD: 12.6 % — SIGNIFICANT CHANGE UP (ref 10.3–14.5)
RBC # FLD: 12.6 % — SIGNIFICANT CHANGE UP (ref 10.3–14.5)
SODIUM SERPL-SCNC: 138 MMOL/L — SIGNIFICANT CHANGE UP (ref 135–145)
SODIUM SERPL-SCNC: 138 MMOL/L — SIGNIFICANT CHANGE UP (ref 135–145)
SP GR SPEC: 1.01 — SIGNIFICANT CHANGE UP (ref 1–1.03)
SP GR SPEC: 1.01 — SIGNIFICANT CHANGE UP (ref 1–1.03)
UROBILINOGEN FLD QL: 0.2 MG/DL — SIGNIFICANT CHANGE UP (ref 0.2–1)
UROBILINOGEN FLD QL: 0.2 MG/DL — SIGNIFICANT CHANGE UP (ref 0.2–1)
WBC # BLD: 6.76 K/UL — SIGNIFICANT CHANGE UP (ref 3.8–10.5)
WBC # BLD: 6.76 K/UL — SIGNIFICANT CHANGE UP (ref 3.8–10.5)
WBC # FLD AUTO: 6.76 K/UL — SIGNIFICANT CHANGE UP (ref 3.8–10.5)
WBC # FLD AUTO: 6.76 K/UL — SIGNIFICANT CHANGE UP (ref 3.8–10.5)

## 2023-10-28 PROCEDURE — 99284 EMERGENCY DEPT VISIT MOD MDM: CPT

## 2023-10-28 PROCEDURE — 93010 ELECTROCARDIOGRAM REPORT: CPT

## 2023-10-28 PROCEDURE — 74176 CT ABD & PELVIS W/O CONTRAST: CPT | Mod: 26,MA

## 2023-10-28 RX ORDER — LIDOCAINE 4 G/100G
1 CREAM TOPICAL ONCE
Refills: 0 | Status: COMPLETED | OUTPATIENT
Start: 2023-10-28 | End: 2023-10-28

## 2023-10-28 RX ORDER — CYCLOBENZAPRINE HYDROCHLORIDE 10 MG/1
5 TABLET, FILM COATED ORAL ONCE
Refills: 0 | Status: COMPLETED | OUTPATIENT
Start: 2023-10-28 | End: 2023-10-28

## 2023-10-28 RX ORDER — KETOROLAC TROMETHAMINE 30 MG/ML
15 SYRINGE (ML) INJECTION ONCE
Refills: 0 | Status: DISCONTINUED | OUTPATIENT
Start: 2023-10-28 | End: 2023-10-28

## 2023-10-28 RX ORDER — SODIUM CHLORIDE 9 MG/ML
1000 INJECTION INTRAMUSCULAR; INTRAVENOUS; SUBCUTANEOUS ONCE
Refills: 0 | Status: COMPLETED | OUTPATIENT
Start: 2023-10-28 | End: 2023-10-28

## 2023-10-28 RX ADMIN — CYCLOBENZAPRINE HYDROCHLORIDE 5 MILLIGRAM(S): 10 TABLET, FILM COATED ORAL at 18:03

## 2023-10-28 RX ADMIN — Medication 15 MILLIGRAM(S): at 20:17

## 2023-10-28 RX ADMIN — LIDOCAINE 1 PATCH: 4 CREAM TOPICAL at 18:02

## 2023-10-28 RX ADMIN — SODIUM CHLORIDE 1000 MILLILITER(S): 9 INJECTION INTRAMUSCULAR; INTRAVENOUS; SUBCUTANEOUS at 18:02

## 2023-10-28 RX ADMIN — Medication 15 MILLIGRAM(S): at 18:03

## 2023-10-28 RX ADMIN — SODIUM CHLORIDE 1000 MILLILITER(S): 9 INJECTION INTRAMUSCULAR; INTRAVENOUS; SUBCUTANEOUS at 20:26

## 2023-10-28 NOTE — ED ADULT TRIAGE NOTE - CHIEF COMPLAINT QUOTE
BIBA for lower back pain x 2 days, no trauma BIBA for lower back pain x 2 days, low BP at home in the 80's ,no trauma

## 2023-10-28 NOTE — ED ADULT NURSE NOTE - NSFALLHARMRISKINTERV_ED_ALL_ED
Assistance OOB with selected safe patient handling equipment if applicable/Assistance with ambulation/Communicate risk of Fall with Harm to all staff, patient, and family/Monitor gait and stability/Provide visual cue: red socks, yellow wristband, yellow gown, etc/Reinforce activity limits and safety measures with patient and family/Bed in lowest position, wheels locked, appropriate side rails in place/Call bell, personal items and telephone in reach/Instruct patient to call for assistance before getting out of bed/chair/stretcher/Non-slip footwear applied when patient is off stretcher/[place] to call system/Physically safe environment - no spills, clutter or unnecessary equipment/Purposeful Proactive Rounding/Room/bathroom lighting operational, light cord in reach

## 2023-10-28 NOTE — ED ADULT NURSE NOTE - OBJECTIVE STATEMENT
Pt presents to the ED accompanied by daughter for left medial back pain for a few days. Per daughter, the mom has been complaining of pain and Tylenol, heat and icy hot were given without any relief. Per daughter, pt is not alert and has a hx of Dementia. Pt ia sleepy, in NAD. Unable to ambulate, safety maintained.

## 2023-10-29 VITALS
OXYGEN SATURATION: 98 % | RESPIRATION RATE: 20 BRPM | TEMPERATURE: 98 F | DIASTOLIC BLOOD PRESSURE: 69 MMHG | SYSTOLIC BLOOD PRESSURE: 155 MMHG | HEART RATE: 60 BPM

## 2023-10-29 PROCEDURE — 81001 URINALYSIS AUTO W/SCOPE: CPT

## 2023-10-29 PROCEDURE — 80053 COMPREHEN METABOLIC PANEL: CPT

## 2023-10-29 PROCEDURE — 87086 URINE CULTURE/COLONY COUNT: CPT

## 2023-10-29 PROCEDURE — 74176 CT ABD & PELVIS W/O CONTRAST: CPT | Mod: MA

## 2023-10-29 PROCEDURE — 85025 COMPLETE CBC W/AUTO DIFF WBC: CPT

## 2023-10-29 PROCEDURE — 99285 EMERGENCY DEPT VISIT HI MDM: CPT | Mod: 25

## 2023-10-29 PROCEDURE — 93005 ELECTROCARDIOGRAM TRACING: CPT

## 2023-10-29 PROCEDURE — 96361 HYDRATE IV INFUSION ADD-ON: CPT

## 2023-10-29 PROCEDURE — 87186 SC STD MICRODIL/AGAR DIL: CPT

## 2023-10-29 PROCEDURE — 36415 COLL VENOUS BLD VENIPUNCTURE: CPT

## 2023-10-29 PROCEDURE — 96374 THER/PROPH/DIAG INJ IV PUSH: CPT

## 2023-10-29 RX ORDER — CEFUROXIME AXETIL 250 MG
1 TABLET ORAL
Qty: 14 | Refills: 0
Start: 2023-10-29 | End: 2023-11-04

## 2023-10-29 RX ORDER — CEFUROXIME AXETIL 250 MG
250 TABLET ORAL ONCE
Refills: 0 | Status: COMPLETED | OUTPATIENT
Start: 2023-10-29 | End: 2023-10-29

## 2023-10-29 RX ORDER — CEFTRIAXONE 500 MG/1
1000 INJECTION, POWDER, FOR SOLUTION INTRAMUSCULAR; INTRAVENOUS ONCE
Refills: 0 | Status: COMPLETED | OUTPATIENT
Start: 2023-10-29 | End: 2023-10-29

## 2023-10-29 RX ADMIN — Medication 250 MILLIGRAM(S): at 01:36

## 2023-10-29 NOTE — ED PROVIDER NOTE - PATIENT PORTAL LINK FT
You can access the FollowMyHealth Patient Portal offered by [facility] by registering at the following website: [email or web address]. By joining [facility]’s FollowMyHealth portal, you will also be able to view your health information using other applications (apps) compatible with our system.

## 2023-10-29 NOTE — ED PROVIDER NOTE - NSFOLLOWUPINSTRUCTIONS_ED_ALL_ED_FT
English    Urinary Tract Infection, Adult    A urinary tract infection (UTI) is an infection of any part of the urinary tract. The urinary tract includes the kidneys, ureters, bladder, and urethra. These organs make, store, and get rid of urine in the body.    An upper UTI affects the ureters and kidneys. A lower UTI affects the bladder and urethra.    What are the causes?  Most urinary tract infections are caused by bacteria in your genital area around your urethra, where urine leaves your body. These bacteria grow and cause inflammation of your urinary tract.    What increases the risk?  You are more likely to develop this condition if:  You have a urinary catheter that stays in place.  You are not able to control when you urinate or have a bowel movement (incontinence).  You are female and you:  Use a spermicide or diaphragm for birth control.  Have low estrogen levels.  Are pregnant.  You have certain genes that increase your risk.  You are sexually active.  You take antibiotic medicines.  You have a condition that causes your flow of urine to slow down, such as:  An enlarged prostate, if you are male.  Blockage in your urethra.  A kidney stone.  A nerve condition that affects your bladder control (neurogenic bladder).  Not getting enough to drink, or not urinating often.  You have certain medical conditions, such as:  Diabetes.  A weak disease-fighting system (immunesystem).  Sickle cell disease.  Gout.  Spinal cord injury.  What are the signs or symptoms?  Symptoms of this condition include:  Needing to urinate right away (urgency).  Frequent urination. This may include small amounts of urine each time you urinate.  Pain or burning with urination.  Blood in the urine.  Urine that smells bad or unusual.  Trouble urinating.  Cloudy urine.  Vaginal discharge, if you are female.  Pain in the abdomen or the lower back.  You may also have:  Vomiting or a decreased appetite.  Confusion.  Irritability or tiredness.  A fever or chills.  Diarrhea.  The first symptom in older adults may be confusion. In some cases, they may not have any symptoms until the infection has worsened.    How is this diagnosed?  This condition is diagnosed based on your medical history and a physical exam. You may also have other tests, including:  Urine tests.  Blood tests.  Tests for STIs (sexually transmitted infections).  If you have had more than one UTI, a cystoscopy or imaging studies may be done to determine the cause of the infections.    How is this treated?  Treatment for this condition includes:  Antibiotic medicine.  Over-the-counter medicines to treat discomfort.  Drinking enough water to stay hydrated.  If you have frequent infections or have other conditions such as a kidney stone, you may need to see a health care provider who specializes in the urinary tract (urologist).    In rare cases, urinary tract infections can cause sepsis. Sepsis is a life-threatening condition that occurs when the body responds to an infection. Sepsis is treated in the hospital with IV antibiotics, fluids, and other medicines.    Follow these instructions at home:    Medicines    Take over-the-counter and prescription medicines only as told by your health care provider.  If you were prescribed an antibiotic medicine, take it as told by your health care provider. Do not stop using the antibiotic even if you start to feel better.  General instructions    Make sure you:  Empty your bladder often and completely. Do not hold urine for long periods of time.  Empty your bladder after sex.  Wipe from front to back after urinating or having a bowel movement if you are female. Use each tissue only one time when you wipe.  Drink enough fluid to keep your urine pale yellow.  Keep all follow-up visits. This is important.  Contact a health care provider if:  Your symptoms do not get better after 1–2 days.  Your symptoms go away and then return.  Get help right away if:  You have severe pain in your back or your lower abdomen.  You have a fever or chills.  You have nausea or vomiting.  Summary  A urinary tract infection (UTI) is an infection of any part of the urinary tract, which includes the kidneys, ureters, bladder, and urethra.  Most urinary tract infections are caused by bacteria in your genital area.  Treatment for this condition often includes antibiotic medicines.  If you were prescribed an antibiotic medicine, take it as told by your health care provider. Do not stop using the antibiotic even if you start to feel better.  Keep all follow-up visits. This is important.  This information is not intended to replace advice given to you by your health care provider. Make sure you discuss any questions you have with your health care provider.    Document Revised: 07/30/2021 Document Reviewed: 07/30/2021  [employer] Patient Education © 2023 [employer]

## 2023-10-29 NOTE — ED PROVIDER NOTE - OBJECTIVE STATEMENT
87 y/o woman, h/o A-fib, dementia, DM, HTN, c/o low back pain x 2 days.  Also BP was low, in the 80s, today.  No fever/weakness/numbness/trauma/urinary incontinence/dysuria.

## 2023-10-29 NOTE — ED PROVIDER NOTE - CLINICAL SUMMARY MEDICAL DECISION MAKING FREE TEXT BOX
87 y/o woman, h/o A-fib, dementia, DM, HTN, c/o low back pain x 2 days.  Also BP was low, in the 80s, today--labs, urine, L-S X-ray, reassess

## 2023-10-31 LAB
-  AMIKACIN: SIGNIFICANT CHANGE UP
-  AMIKACIN: SIGNIFICANT CHANGE UP
-  AMOXICILLIN/CLAVULANIC ACID: SIGNIFICANT CHANGE UP
-  AMOXICILLIN/CLAVULANIC ACID: SIGNIFICANT CHANGE UP
-  AMPICILLIN/SULBACTAM: SIGNIFICANT CHANGE UP
-  AMPICILLIN/SULBACTAM: SIGNIFICANT CHANGE UP
-  AMPICILLIN: SIGNIFICANT CHANGE UP
-  AMPICILLIN: SIGNIFICANT CHANGE UP
-  AZTREONAM: SIGNIFICANT CHANGE UP
-  AZTREONAM: SIGNIFICANT CHANGE UP
-  CEFAZOLIN: SIGNIFICANT CHANGE UP
-  CEFAZOLIN: SIGNIFICANT CHANGE UP
-  CEFEPIME: SIGNIFICANT CHANGE UP
-  CEFEPIME: SIGNIFICANT CHANGE UP
-  CEFOXITIN: SIGNIFICANT CHANGE UP
-  CEFOXITIN: SIGNIFICANT CHANGE UP
-  CEFTRIAXONE: SIGNIFICANT CHANGE UP
-  CEFTRIAXONE: SIGNIFICANT CHANGE UP
-  CEFUROXIME: SIGNIFICANT CHANGE UP
-  CEFUROXIME: SIGNIFICANT CHANGE UP
-  CIPROFLOXACIN: SIGNIFICANT CHANGE UP
-  CIPROFLOXACIN: SIGNIFICANT CHANGE UP
-  ERTAPENEM: SIGNIFICANT CHANGE UP
-  ERTAPENEM: SIGNIFICANT CHANGE UP
-  GENTAMICIN: SIGNIFICANT CHANGE UP
-  GENTAMICIN: SIGNIFICANT CHANGE UP
-  IMIPENEM: SIGNIFICANT CHANGE UP
-  IMIPENEM: SIGNIFICANT CHANGE UP
-  LEVOFLOXACIN: SIGNIFICANT CHANGE UP
-  LEVOFLOXACIN: SIGNIFICANT CHANGE UP
-  MEROPENEM: SIGNIFICANT CHANGE UP
-  MEROPENEM: SIGNIFICANT CHANGE UP
-  NITROFURANTOIN: SIGNIFICANT CHANGE UP
-  NITROFURANTOIN: SIGNIFICANT CHANGE UP
-  PIPERACILLIN/TAZOBACTAM: SIGNIFICANT CHANGE UP
-  PIPERACILLIN/TAZOBACTAM: SIGNIFICANT CHANGE UP
-  TOBRAMYCIN: SIGNIFICANT CHANGE UP
-  TOBRAMYCIN: SIGNIFICANT CHANGE UP
-  TRIMETHOPRIM/SULFAMETHOXAZOLE: SIGNIFICANT CHANGE UP
-  TRIMETHOPRIM/SULFAMETHOXAZOLE: SIGNIFICANT CHANGE UP
CULTURE RESULTS: ABNORMAL
CULTURE RESULTS: ABNORMAL
METHOD TYPE: SIGNIFICANT CHANGE UP
METHOD TYPE: SIGNIFICANT CHANGE UP
ORGANISM # SPEC MICROSCOPIC CNT: ABNORMAL
SPECIMEN SOURCE: SIGNIFICANT CHANGE UP
SPECIMEN SOURCE: SIGNIFICANT CHANGE UP

## 2023-11-07 RX ORDER — CEFUROXIME AXETIL 250 MG
1 TABLET ORAL
Qty: 14 | Refills: 0
Start: 2023-11-07 | End: 2023-11-13

## 2024-03-20 NOTE — ED ADULT NURSE NOTE - BREATHING, MLM
MTM referral from: [facility] visit (referral by provider)    MTM referral outreach attempt #1 on March 20, 2024 at 11:00 AM      Outcome: Patient is not interested at this time because her sister is an RN and helped her with this, will route to MTM Pharmacist/Provider as an FYI. Thank you for the referral.     Use bcbs oos for the carrier/Plan on the flowsheet        Angelia Garcia - [facility]    [phone]      
Spontaneous, unlabored and symmetrical
